# Patient Record
Sex: FEMALE | Race: WHITE | NOT HISPANIC OR LATINO
[De-identification: names, ages, dates, MRNs, and addresses within clinical notes are randomized per-mention and may not be internally consistent; named-entity substitution may affect disease eponyms.]

---

## 2017-09-17 ENCOUNTER — TRANSCRIPTION ENCOUNTER (OUTPATIENT)
Age: 22
End: 2017-09-17

## 2017-10-04 ENCOUNTER — TRANSCRIPTION ENCOUNTER (OUTPATIENT)
Age: 22
End: 2017-10-04

## 2017-12-08 ENCOUNTER — TRANSCRIPTION ENCOUNTER (OUTPATIENT)
Age: 22
End: 2017-12-08

## 2017-12-11 ENCOUNTER — TRANSCRIPTION ENCOUNTER (OUTPATIENT)
Age: 22
End: 2017-12-11

## 2018-05-01 ENCOUNTER — TRANSCRIPTION ENCOUNTER (OUTPATIENT)
Age: 23
End: 2018-05-01

## 2018-06-11 ENCOUNTER — TRANSCRIPTION ENCOUNTER (OUTPATIENT)
Age: 23
End: 2018-06-11

## 2019-03-14 ENCOUNTER — TRANSCRIPTION ENCOUNTER (OUTPATIENT)
Age: 24
End: 2019-03-14

## 2019-07-15 ENCOUNTER — APPOINTMENT (OUTPATIENT)
Dept: SURGERY | Facility: CLINIC | Age: 24
End: 2019-07-15
Payer: COMMERCIAL

## 2019-07-15 VITALS
WEIGHT: 173.5 LBS | DIASTOLIC BLOOD PRESSURE: 70 MMHG | HEART RATE: 84 BPM | OXYGEN SATURATION: 100 % | SYSTOLIC BLOOD PRESSURE: 108 MMHG | HEIGHT: 63 IN | BODY MASS INDEX: 30.74 KG/M2

## 2019-07-15 DIAGNOSIS — R10.11 RIGHT UPPER QUADRANT PAIN: ICD-10-CM

## 2019-07-15 DIAGNOSIS — G89.29 RIGHT UPPER QUADRANT PAIN: ICD-10-CM

## 2019-07-15 DIAGNOSIS — R10.9 UNSPECIFIED ABDOMINAL PAIN: ICD-10-CM

## 2019-07-15 PROCEDURE — 99204 OFFICE O/P NEW MOD 45 MIN: CPT

## 2019-07-24 ENCOUNTER — TRANSCRIPTION ENCOUNTER (OUTPATIENT)
Age: 24
End: 2019-07-24

## 2019-07-26 ENCOUNTER — FORM ENCOUNTER (OUTPATIENT)
Age: 24
End: 2019-07-26

## 2019-07-26 PROBLEM — R10.11 CHRONIC RIGHT UPPER QUADRANT PAIN: Status: ACTIVE | Noted: 2019-07-26

## 2019-07-27 ENCOUNTER — OUTPATIENT (OUTPATIENT)
Dept: OUTPATIENT SERVICES | Facility: HOSPITAL | Age: 24
LOS: 1 days | End: 2019-07-27
Payer: COMMERCIAL

## 2019-07-27 ENCOUNTER — APPOINTMENT (OUTPATIENT)
Dept: CT IMAGING | Facility: CLINIC | Age: 24
End: 2019-07-27
Payer: COMMERCIAL

## 2019-07-27 DIAGNOSIS — J35.3 HYPERTROPHY OF TONSILS WITH HYPERTROPHY OF ADENOIDS: Chronic | ICD-10-CM

## 2019-07-27 DIAGNOSIS — Z00.8 ENCOUNTER FOR OTHER GENERAL EXAMINATION: ICD-10-CM

## 2019-07-27 PROCEDURE — 74177 CT ABD & PELVIS W/CONTRAST: CPT | Mod: 26

## 2019-07-27 PROCEDURE — 74177 CT ABD & PELVIS W/CONTRAST: CPT

## 2019-07-30 ENCOUNTER — CLINICAL ADVICE (OUTPATIENT)
Age: 24
End: 2019-07-30

## 2019-07-31 PROBLEM — R10.9 ABDOMINAL PAIN: Status: ACTIVE | Noted: 2019-07-15

## 2019-07-31 NOTE — HISTORY OF PRESENT ILLNESS
[de-identified] : Very pleasant young female with history of longstanding, low grade abdominal discomfort.\par She is also under the care of a gastroenterologist for GERD, which she feels is well controlled.\par The patient now complains of progressive and now nearly continuous epigastric discomfort, sometimes RUQ pain and intermittent RUQ tenderness.\par She reports sensations of postprandial nausea, actual abdominal bloating/ distension and a feeling of food "being stuck."\par To date, an abdominal sonogram as well as an EGD and subsequent nuclear emptying study have been done through her GI or via ER settings.

## 2019-07-31 NOTE — REASON FOR VISIT
[Initial Evaluation] : an initial evaluation [FreeTextEntry1] : of abdominal pain, RUQ tenderness and ongoing reflux...

## 2019-07-31 NOTE — PLAN
[FreeTextEntry1] : Longstanding history of abdominal discomfort/ pain and associated GI complaints suggestive of IBS.\par However, recent complaints also suggestive new issue(s).\par EGD and Pathology generally reassuring and consistent with controlled GERD.\par Importance of compliance and ongoing follow-up discussed with patient and mother.\par In regards to more focal Epigastric discomfort and RUQ pain with questionable mass in setting of tenderness, I would favor CT to rule out lesion (to date sonography reassuring/ non-directive.)\par IF CT is unrevealing, then consideration could also be given to HIDA CCK to assist in ascertaining biliary dyskinesia.\par In the meanwhile, I have encouraged the patient to call with interval questions, further future concerns or additional changes.\par She has been asked to continue to track her symptoms in the meanwhile to hopefully establish more clearly any palliative or provocative factors.

## 2019-07-31 NOTE — PHYSICAL EXAM
[Normal Rate and Rhythm] : normal rate and rhythm [Respiratory Effort] : normal respiratory effort [Alert] : alert [No Rash or Lesion] : No rash or lesion [Oriented to Person] : oriented to person [Oriented to Time] : oriented to time [Oriented to Place] : oriented to place [de-identified] : Appears well, no acute distress, ambulates easily into office and assumes examination table without need of assistance. [Anxious] : anxious [de-identified] : Supple with full range of motion. [de-identified] : Normocephalic and atraumatic. [FreeTextEntry1] : No cervical, supraclavicular, axillary or inguinal adenopathy. [de-identified] : N [de-identified] : Grossly symmetric and within normal limits without any obvious motor or sensory deficits. [de-identified] : Slightly full or mildly obese, but soft and nontense.\par Epigastrium free of visible bulges or palpable masses.\par Tenderness and questionable fullness at and around right costal margin, though definitive examination difficult secondary to patient's anxiety and discomfort.  Periumbilical fascia intact.  Bilateral groins unremarkable during cough and Valsalva type maneuvers. [de-identified] : Deferred. [de-identified] : N

## 2019-07-31 NOTE — REVIEW OF SYSTEMS
[Feeling Tired] : feeling tired [As Noted in HPI] : as noted in HPI [Anxiety] : anxiety [Depression] : depression [Easy Bruising] : a tendency for easy bruising [Negative] : Endocrine

## 2019-07-31 NOTE — DATA REVIEWED
[FreeTextEntry1] : EGD report reviewed and discussed.\par EGD Pathology report reviewed and discussed.\par Nuclear gastric emptying study report reviewed and discussed.\par Obtaining sonography report from study done earlier this year at [facility].

## 2019-10-08 ENCOUNTER — TRANSCRIPTION ENCOUNTER (OUTPATIENT)
Age: 24
End: 2019-10-08

## 2019-10-24 ENCOUNTER — CLINICAL ADVICE (OUTPATIENT)
Age: 24
End: 2019-10-24

## 2019-11-06 ENCOUNTER — TRANSCRIPTION ENCOUNTER (OUTPATIENT)
Age: 24
End: 2019-11-06

## 2020-01-17 ENCOUNTER — CLINICAL ADVICE (OUTPATIENT)
Age: 25
End: 2020-01-17

## 2020-01-22 ENCOUNTER — CLINICAL ADVICE (OUTPATIENT)
Age: 25
End: 2020-01-22

## 2020-02-12 ENCOUNTER — TRANSCRIPTION ENCOUNTER (OUTPATIENT)
Age: 25
End: 2020-02-12

## 2020-02-25 ENCOUNTER — TRANSCRIPTION ENCOUNTER (OUTPATIENT)
Age: 25
End: 2020-02-25

## 2020-05-20 ENCOUNTER — APPOINTMENT (OUTPATIENT)
Dept: BARIATRICS/WEIGHT MGMT | Facility: CLINIC | Age: 25
End: 2020-05-20
Payer: COMMERCIAL

## 2020-05-20 VITALS — WEIGHT: 172 LBS

## 2020-05-20 PROCEDURE — 99205 OFFICE O/P NEW HI 60 MIN: CPT | Mod: 95

## 2020-05-20 NOTE — ASSESSMENT
[FreeTextEntry1] : 25 y.o student with Class 1 obesity, GERD with chronic low grade nausea, insulin resistance - prediabetes in teen years, likely PCOS here for weight management. \par \par - start metformin for likely PCOS and insulin resistance, and phentermine 15mg\par - water intake 64 oz minimum\par - decrease snacking, increase fiber dottie in breakfast\par Labs - get labs done when we are able to.  Patient lives in [place]. \par \par Extensive dietary counseling was provided:\par -discussed calorie reduction options: Mediterranean w/ calorie reduction - she is interested in having meatless days. She has been told "chicken chicken chicken" by folks at the [employer] arts/boxing school, so that would be different for her\par -three meal components emphasized: large portion vegetables/fruit, smaller portion protein favoring plant-based, smaller portion high fiber carbohydrates\par -properties of macronutrients were reviewed to help the patient understand why a balanced diet is important\par -discussed the avoidance of red and processed meat, sugar sweetened beverages, refined carbohydrates, high fat dairy\par -advised avoidance of snack products and packaged / processed foods\par -counseled about meal timing and portion: large breakfast, medium lunch, small dinner\par -advised to avoid carbohydrates in evening if possible\par -regular water or seltzer throughout the day\par -for stimulus control, advised to keep unhealthy foods out of the house or out of view\par -recommended abstaining entirely from restaurant / fast food / take-out meals\par \par Lifestyle recommendations for weight loss were extensively reviewed\par -aerobic exercise reviewed: moderate intensity versus high intensity exercise - an estimate of daily / weekly time requirements was reviewed\par -emphasized that resistance training in addition to aerobic may provide added benefit\par -emphasized that long term weight loss and maintenance depend upon exercise\par -the need for adequate sleep (6+ hours) was reviewed - will follow sleep quality, fatigue\par \par f/u 2 weeks with this writer\par RDN consult\par \par Bariatric surgery history: none\par Obesity co-morbidities:gerd, insulin resistance\par Comorbidities improved or resolved:n/a\par Anti-obesity medications:started today - phentermine, metformin\par Obesity medication side effects:n/a\par

## 2020-05-20 NOTE — HISTORY OF PRESENT ILLNESS
[Home] : at home, [unfilled] , at the time of the visit. [Verbal consent obtained from patient] : the patient, [unfilled] [Medical Office: ([facility])___] : at the medical office located in  [FreeTextEntry1] : **Verbal consent was obtained over the phone for patient for this telehealth visit which was done via [facility]. Written consult was unable to be obtained to covid emergency.**\par \par Ms. JON MELCHOR is a 25 year year old female who presents for evaluation and treatment of Class 1 obesity. \par \par Obesity related co-morbidities:  GERD - has had several endoscopies in the past, last was 1 year ago.  \par Has been on the prozac for depression for about a year.  and buspar - anxiety.  \par \par In the past - borderline diabetes around 13-15 y.o.  All 4 grandparents have diabetes. \par Depression - talks to therapist once a week, to focus on herself more.  She has improved over the last year in doing so. \par \par +some family trauma. college - was crying every day and felt "this is just not how I want to live"\par GERD-Feels like she's "always nauseous."   She feels that she's always felt this way. \par Patient lives - with 2 roommates.  Roommates buy their own food.\par Employment status - grad student, in psychology field\par \par Weight History:\par Lowest adult weight: 162\par Highest adult weight:198\par \par Has lost 20-25 pounds over the past year since July\par \par Obesity- began in childhood.  Mother has lap band in place.  Weight gain has occurred with: gradual, unsure of exactly why.  She had previously been eating more processed foods but has "never been an overeater, or snacking on a lot of food"\par \par Has a cyst on ovary since age 14, has been on birth control since then. \par \par Since June 2019 - lost 20-25. Did a 6 week plan with a [employer] arts/fight club, with their food plan - she started to eat more healthy food.  She was on phentermine as well for 3 months, and then another 3 months from her gyn PA.  That also helped. She's been weight stable for the past 2-3 months off of phentermine.\par \par Past weight loss attempts include:  Weight Watchers, diet pills, sara gomez - . These have produced a maximum of 20-25 pounds of weight loss.  \par Anti-obesity medications in the past: phentermine\par \par Reasons for desiring weight loss:feel confident\par Perceived obstacles to losing weight: weight regain\par \par Sleep: 7-8 hours. always feel tired during the day. \par \par Has 3 regular meals a day. \par \par Diet history:\par wakes up at: 7am \par B: 8:30am - eggs w veggies, or protein shake with fruit\par L: 12:30-1pm - brown rice, veggie and a meat\par D: protein, veggie, something else\par \par maybe on the weekends - take out.  making meals at home. \par snacks: afternoon and evening. carrots and  hummus, fruit, ice cream.  about 2 snacks per day. \par eating after dinner: sometimes - yes. protein "dessert" with shake or peanut butter. \par overeating episodes: No.  Able to control her portions. \par \par Sodas/fast food/processed foods:no sodas. take out sometimes 1-2 times a week. \par coffee: in the morning with breakfast or afternoon. \par \par Water intake per day: struggles to get in per day. Right now, has a gallon per day. \par \par Physical activity:\par Patient enjoys: weightlifting, boxing\par Current physical activity: 4-5 times a week, work out with trainers.  At least 1 hr each day. Zoom meeting with friend - doing that Friday, doing another 4 weeks. \par joined weight loss challenge at a fight club.  HIT workout - muscle training, with cardio involved.  \par \par Habits patient would like to change: eat healthier. \par Level of interest in losing weight: 5/5\par Community support: 3/5\par \par Factors that have helped in the past with losing weight and keeping it off: medication, exercise, putting the time in. \par \par

## 2020-05-20 NOTE — REVIEW OF SYSTEMS
[Patient Intake Form Reviewed] : Patient intake form was reviewed [MED-ROS-Gastro-FT] : chronic nausea [MED-ROS-Cons-FT] : weight gain, fatigue

## 2020-05-20 NOTE — REASON FOR VISIT
[Initial Consultation] : an initial consultation for [Fatigue] : fatigue  [Obesity] : obesity [FreeTextEntry2] : gerd

## 2020-06-04 ENCOUNTER — APPOINTMENT (OUTPATIENT)
Dept: BARIATRICS/WEIGHT MGMT | Facility: CLINIC | Age: 25
End: 2020-06-04
Payer: COMMERCIAL

## 2020-06-04 VITALS — WEIGHT: 173 LBS

## 2020-06-04 PROCEDURE — 99214 OFFICE O/P EST MOD 30 MIN: CPT | Mod: 95

## 2020-06-04 RX ORDER — PHENTERMINE HYDROCHLORIDE 15 MG/1
15 CAPSULE ORAL DAILY
Qty: 30 | Refills: 0 | Status: DISCONTINUED | COMMUNITY
Start: 2020-05-20 | End: 2020-06-04

## 2020-06-04 NOTE — ASSESSMENT
[FreeTextEntry1] : 25 y.o student with Class 1 obesity, GERD with chronic low grade nausea, insulin resistance - prediabetes in teen years, likely PCOS here for weight management. \par \par - she senses that she has more of a sweet tooth now that she's decreased animal meat signficantly. \par \par - continue metformin, increase phentermine to 37.5mg capsule\par - water intake 64 oz minimum - patient will try better with water\par - decrease snacking, increase fiber dottie in breakfast - > she is avoiding eggs for breakfast.  She has rolled oats, talked about adding some toppings like cinnamon, vanilla, cocoa powder.\par - Labs - get labs done when we are able to.  Patient lives in [place]. \par \par f/u 2 weeks with this writer\par RDN consult\par \par Bariatric surgery history: none\par Obesity co-morbidities:gerd, insulin resistance\par Comorbidities improved or resolved:n/a\par Anti-obesity medications:started today - phentermine, metformin\par Obesity medication side effects:n/a\par

## 2020-06-04 NOTE — HISTORY OF PRESENT ILLNESS
[Home] : at home, [unfilled] , at the time of the visit. [Medical Office: ([facility])___] : at the medical office located in  [Verbal consent obtained from patient] : the patient, [unfilled] [FreeTextEntry1] : **Verbal consent was obtained over the phone for patient for this telehealth visit which was done via [employer] Written consult was unable to be obtained to covid emergency.**\par \par Ms. JON MELCHOR is a 25 year year old female who presents for evaluation and treatment of Class 1 obesity. \par \par - patient lives in [place].  would like to get labs sometime eventually when able.\par \par Obesity related co-morbidities:  GERD - has had several endoscopies in the past, last was 1 year ago.  \par Has been on the prozac for depression for about a year.  and buspar - anxiety.  \par \par Meds\par Started 15mg phentermine - feeling less bloated, but still having appetite between meals.  "not cravings, true hunger."  she states that without eating meats/chicken she has increased hunger as well, "maybe because I miss it"\par \par -112 fasting glucose - she has an old glucometer and she checks it on occasion. \par breakfast 8:30am, lunch, 12-1pm, dinner 4:30-5pm\par between lunch and dinner, she has increased appetite, sometimes after dinner.\par She has tried phentermine 37.5 in the past, would like to try. \par \par In the past - borderline diabetes around 13-15 y.o.  All 4 grandparents have diabetes. \par Depression - talks to therapist once a week, to focus on herself more.  She has improved over the last year in doing so. \par \par getting a sweet tooth craving - sometimes she'll do protein powder\par breakfast - rolled oats, strawberries. \par 2 days a week - will have seafood or chicken, asked about eggs for breakfast\par weight - 173#\par \par Summer classes starting soon.  Grad student.  She was always on the go before, didn't think about food much, but not she is because she's always at home. \par \par Water - 32 oz per day daily. Wants to increase to 64 oz. \par Exercise 4 days a , cardio HIIT workout\par \par Patient lives - with 2 roommates.  Roommates buy their own food.\par Employment status - grad student, in psychology field\par

## 2020-06-04 NOTE — PHYSICAL EXAM
[Normal] : PERRL, EOMI, no conjunctival injection, anicteric [Obese, well nourished, in no acute distress] : obese, well nourished, in no acute distress

## 2020-06-04 NOTE — REASON FOR VISIT
[Follow-Up Visit] : a follow-up visit for [Obesity] : obesity [FreeTextEntry2] : GERD, insulin resistance, anxiety

## 2020-06-05 ENCOUNTER — APPOINTMENT (OUTPATIENT)
Dept: BARIATRICS/WEIGHT MGMT | Facility: CLINIC | Age: 25
End: 2020-06-05

## 2020-06-19 ENCOUNTER — APPOINTMENT (OUTPATIENT)
Dept: BARIATRICS/WEIGHT MGMT | Facility: CLINIC | Age: 25
End: 2020-06-19

## 2020-07-24 ENCOUNTER — TRANSCRIPTION ENCOUNTER (OUTPATIENT)
Age: 25
End: 2020-07-24

## 2020-08-03 ENCOUNTER — TRANSCRIPTION ENCOUNTER (OUTPATIENT)
Age: 25
End: 2020-08-03

## 2020-08-03 ENCOUNTER — APPOINTMENT (OUTPATIENT)
Dept: BARIATRICS/WEIGHT MGMT | Facility: CLINIC | Age: 25
End: 2020-08-03
Payer: COMMERCIAL

## 2020-08-03 VITALS — WEIGHT: 176.2 LBS

## 2020-08-03 PROCEDURE — 99214 OFFICE O/P EST MOD 30 MIN: CPT | Mod: 95

## 2020-08-03 NOTE — ASSESSMENT
[FreeTextEntry1] : 25 y.o student with Class 1 obesity, GERD with chronic low grade nausea, insulin resistance - prediabetes in teen years, likely PCOS here for weight management. \par \par - will get blood work.  Will see if insurance would cover GLP-1. She is open to injection.  Provided her with some brand names. \par - can stop phentermine when she is done with medications as it is not affecting her appetite, would recommend continuing metfomrin\par - she senses that she has more of a sweet tooth now that she's decreased animal meat significantly. \par - seeing RDN soon.\par - water intake 64 oz minimum - patient will try better with water\par - decrease snacking, increase fiber dottie in breakfast - > she is avoiding eggs for breakfast.  She has rolled oats, talked about adding some toppings like cinnamon, vanilla, cocoa powder.\par \par f/u 2 weeks with this writer\par RDN consult\par \par Bariatric surgery history: none\par Obesity co-morbidities:gerd, insulin resistance\par Comorbidities improved or resolved:n/a\par Anti-obesity medications: metformin\par Obesity medication side effects:n/a\par

## 2020-08-03 NOTE — HISTORY OF PRESENT ILLNESS
[Home] : at home, [unfilled] , at the time of the visit. [Medical Office: ([facility])___] : at the medical office located in  [Verbal consent obtained from patient] : the patient, [unfilled] [FreeTextEntry1] : **Verbal consent was obtained over the phone for patient for this telehealth visit which was done via [employer]. Written consult was unable to be obtained to covid emergency.**\par \par Ms. JON MELCHOR is a 25 year year old female who presents for evaluation and treatment of Class 1 obesity. \par \par - patient lives in [place].  She would like to gets labs.  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  She is doing that 2x a week.  And also some zoom classes - 1 time a weekend.  \par Food habits - feels a constant hunger throughout the day between meals.  She did not have this experience when she was on phentermine 37.5 before. She does not take metformin consistently.  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 Or on the go, a granola bar. \par lunch - noon.  brown rice 1/2 cup, turkey/beef/chicken breast - 4 oz or so, and vegetables - she packs her lunch. \par dinner - 5pm. 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  All 4 grandparents have diabetes. \par Depression - talks to therapist once a week, to focus on herself more.  She has improved over the last year in doing so. \par \par Water\par 32 oz per day daily. Wants to increase to 64 oz. \par \par Sleep\par sleeping well overall, 7-8 hours per night\par \par Patient lives - with 2 roommates.  Roommates buy their own food.\par

## 2020-08-11 LAB
25(OH)D3 SERPL-MCNC: 53.8 NG/ML
ALBUMIN SERPL ELPH-MCNC: 4.3 G/DL
ALP BLD-CCNC: 55 U/L
ALT SERPL-CCNC: 16 U/L
ANION GAP SERPL CALC-SCNC: 10 MMOL/L
AST SERPL-CCNC: 17 U/L
BASOPHILS # BLD AUTO: 0.03 K/UL
BASOPHILS NFR BLD AUTO: 0.5 %
BILIRUB SERPL-MCNC: 0.2 MG/DL
BUN SERPL-MCNC: 9 MG/DL
CALCIUM SERPL-MCNC: 9.7 MG/DL
CHLORIDE SERPL-SCNC: 102 MMOL/L
CHOLEST SERPL-MCNC: 161 MG/DL
CHOLEST/HDLC SERPL: 3.8 RATIO
CO2 SERPL-SCNC: 24 MMOL/L
CREAT SERPL-MCNC: 0.76 MG/DL
CRP SERPL HS-MCNC: 3.8 MG/L
EOSINOPHIL # BLD AUTO: 0.17 K/UL
EOSINOPHIL NFR BLD AUTO: 2.8 %
ESTIMATED AVERAGE GLUCOSE: 103 MG/DL
FERRITIN SERPL-MCNC: 70 NG/ML
GLUCOSE SERPL-MCNC: 91 MG/DL
HBA1C MFR BLD HPLC: 5.2 %
HCT VFR BLD CALC: 42.3 %
HDLC SERPL-MCNC: 42 MG/DL
HGB BLD-MCNC: 14.1 G/DL
IMM GRANULOCYTES NFR BLD AUTO: 0.3 %
INSULIN P FAST SERPL-ACNC: 16.7 UU/ML
IRON SATN MFR SERPL: 21 %
IRON SERPL-MCNC: 84 UG/DL
LDLC SERPL CALC-MCNC: 88 MG/DL
LYMPHOCYTES # BLD AUTO: 2.64 K/UL
LYMPHOCYTES NFR BLD AUTO: 43.3 %
MAN DIFF?: NORMAL
MCHC RBC-ENTMCNC: 31.2 PG
MCHC RBC-ENTMCNC: 33.3 GM/DL
MCV RBC AUTO: 93.6 FL
MONOCYTES # BLD AUTO: 0.33 K/UL
MONOCYTES NFR BLD AUTO: 5.4 %
NEUTROPHILS # BLD AUTO: 2.9 K/UL
NEUTROPHILS NFR BLD AUTO: 47.7 %
PLATELET # BLD AUTO: 227 K/UL
POTASSIUM SERPL-SCNC: 4.7 MMOL/L
PROT SERPL-MCNC: 7 G/DL
RBC # BLD: 4.52 M/UL
RBC # FLD: 12.2 %
SODIUM SERPL-SCNC: 137 MMOL/L
T3FREE SERPL-MCNC: 3.05 PG/ML
T4 FREE SERPL-MCNC: 1 NG/DL
TIBC SERPL-MCNC: 407 UG/DL
TRIGL SERPL-MCNC: 158 MG/DL
TSH SERPL-ACNC: 1.3 UIU/ML
UIBC SERPL-MCNC: 322 UG/DL
WBC # FLD AUTO: 6.09 K/UL

## 2020-08-12 ENCOUNTER — APPOINTMENT (OUTPATIENT)
Dept: BARIATRICS/WEIGHT MGMT | Facility: CLINIC | Age: 25
End: 2020-08-12
Payer: COMMERCIAL

## 2020-08-12 VITALS — WEIGHT: 176 LBS

## 2020-08-12 PROCEDURE — 99214 OFFICE O/P EST MOD 30 MIN: CPT | Mod: 95

## 2020-08-12 RX ORDER — PHENTERMINE HYDROCHLORIDE 37.5 MG/1
37.5 CAPSULE ORAL
Qty: 30 | Refills: 0 | Status: DISCONTINUED | COMMUNITY
Start: 2020-06-04 | End: 2020-08-12

## 2020-08-12 NOTE — ASSESSMENT
[FreeTextEntry1] : 25 y.o student with Class 1 obesity, GERD with chronic low grade nausea, insulin resistance - prediabetes in teen years, likely PCOS here for weight management. \par \par - start Ozempic. Discussed side effects.  She already has some nausea with GERD, will monitor. \par - recommend continuing metformin\par - she senses that she has more of a sweet tooth now that she's decreased animal meat significantly. \par - seeing RDN soon.\par - water intake 64 oz minimum - patient will try better with water\par - decrease snacking, increase fiber dottie in breakfast - > she is avoiding eggs for breakfast.  She has rolled oats, talked about adding some toppings like cinnamon, vanilla, cocoa powder.\par \par f/u 2 weeks with this writer\par RDN consult\par \par Bariatric surgery history: none\par Obesity co-morbidities:gerd, insulin resistance\par Comorbidities improved or resolved:n/a\par Anti-obesity medications: metformin, Ozempic\par Obesity medication side effects:n/a\par

## 2020-08-12 NOTE — HISTORY OF PRESENT ILLNESS
[Home] : at home, [unfilled] , at the time of the visit. [Medical Office: ([facility])___] : at the medical office located in  [Verbal consent obtained from patient] : the patient, [unfilled] [FreeTextEntry1] : **Verbal consent was obtained over the phone for patient for this telehealth visit which was done via [facility]. Written consult was unable to be obtained to covid emergency.**\par \par Ms. JON MELCHOR is a 25 year year old female who presents for evaluation and treatment of Class 1 obesity. \par \par Labs done - slightly elev CRP, TG, low HDL. Normal A1c 5.2.\par She would like to start Ozempic.\par \par Obesity related co-morbidities:  GERD, depression/anxiety.\par \par Interim - \par Food habits - feels a constant hunger throughout the day between meals.  Phentermine nor metformin have helped with this appetite. \par She is exercising, eating mostly home cooked meals. \par \par Dietary\par After a meal, still has hunger - Wanting something "sweet, salty."\par eats at 8am - "it depends" - coffee, oats with peanut butter and fruit, or eggs. Or on the go, a granola bar. \par lunch - noon.  brown rice 1/2 cup, turkey/beef/chicken breast - 4 oz or so, and vegetables - she packs her lunch. 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 \par \par Depression - talks to therapist once a week, to focus on herself more.  She has improved over the last year in doing so. \par \par Water\par 32 oz per day daily. Wants to increase to 64 oz. \par \par Sleep\par sleeping well overall, 7-8 hours per night\par \par Patient lives - with 2 roommates.  Roommates buy their own food.\par

## 2020-08-12 NOTE — PHYSICAL EXAM
[Obese, well nourished, in no acute distress] : obese, well nourished, in no acute distress [Normal] : PERRL, EOMI, no conjunctival injection, anicteric

## 2020-08-26 ENCOUNTER — APPOINTMENT (OUTPATIENT)
Dept: BARIATRICS/WEIGHT MGMT | Facility: CLINIC | Age: 25
End: 2020-08-26
Payer: COMMERCIAL

## 2020-08-26 PROCEDURE — 99214 OFFICE O/P EST MOD 30 MIN: CPT | Mod: 95

## 2020-08-30 VITALS — HEIGHT: 63 IN | WEIGHT: 179 LBS | BODY MASS INDEX: 31.71 KG/M2

## 2020-08-31 NOTE — ASSESSMENT
[FreeTextEntry1] : 25 y.o student with Class 1 obesity, GERD with chronic low grade nausea, insulin resistance - prediabetes in teen years, likely PCOS here for weight management. \par \par - continue Ozempic. denies increased nausea.  would recommend holding off on increasing until seen by gi. \par - recommend f/u with GI\par - she is not taking metformin anymore due to not feeling any effect\par - seeing RDN soon.\par - water intake 64 oz minimum - patient will try better with water\par - decrease snacking, increase fiber dottie in breakfast - > she is avoiding eggs for breakfast.  She has rolled oats, talked about adding some toppings like cinnamon, vanilla, cocoa powder.\par \par f/u 2 weeks with this writer\par RDN consult\par \par Bariatric surgery history: none\par Obesity co-morbidities:gerd, insulin resistance\par Comorbidities improved or resolved:n/a\par Anti-obesity medications: metformin, Ozempic\par Obesity medication side effects:n/a\par

## 2020-08-31 NOTE — HISTORY OF PRESENT ILLNESS
[Home] : at home, [unfilled] , at the time of the visit. [Verbal consent obtained from patient] : the patient, [unfilled] [Medical Office: ([facility])___] : at the medical office located in  [FreeTextEntry1] : **Verbal consent was obtained over the phone for patient for this telehealth visit which was done via [employer]. Written consult was unable to be obtained to covid emergency.**\par \par Ms. JON MELCHOR is a 25 year year old female with Class 1 obesity, GERD, depression anxiety presents for weight management. \par \par Labs done - slightly elev CRP, TG, low HDL. Normal A1c 5.2.\par \par Most recent weight - 179#\par \par Interim\par Started Ozempic last Saturday, so has taken 2 doses so far.  Doing well hasn't noticed much of appetite change but open to giving it some more time. \par Has not noticed any changes.  Joined support group with [employer] regarding Ozempic. \par Having some bloating, but this past month she has gained weight and feeling bloated.  She is frustrated that she is feeling bloated, this was going on before weight management and she was seeing GI but workup was interrupted by Covid. "I can drink some water and feel bloated." She is open to seeing GI again.  She doesn't know what work up needs to be done. \par \par She is exercising, eating mostly home cooked meals. \par \par Dietary\par has been doing more protein shakes.  \par eats at 8am - "it depends" - coffee, oats with peanut butter and fruit, or eggs. Or on the go, a granola bar. \par lunch - noon.  brown rice 1/2 cup, turkey/beef/chicken breast - 4 oz or so, and vegetables - she packs her lunch. 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 \par \par Depression - talks to therapist once a week, to focus on herself more.  She has improved mood over the last year in doing so. \par \par Water\par Felt a little dehydrated.  Bought "Liquid IV" packs 32 oz per day daily  -does have beet sugar and dextrose in it, recommended against this. Wants to increase to 64 oz. 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 \par Patient lives - with 2 roommates.  Roommates buy their own food.\par

## 2020-09-10 ENCOUNTER — APPOINTMENT (OUTPATIENT)
Dept: BARIATRICS/WEIGHT MGMT | Facility: CLINIC | Age: 25
End: 2020-09-10
Payer: COMMERCIAL

## 2020-09-10 VITALS — WEIGHT: 180 LBS | HEIGHT: 63 IN | BODY MASS INDEX: 31.89 KG/M2

## 2020-09-10 PROCEDURE — 99214 OFFICE O/P EST MOD 30 MIN: CPT | Mod: 95

## 2020-09-10 RX ORDER — METFORMIN ER 500 MG 500 MG/1
500 TABLET ORAL DAILY
Qty: 30 | Refills: 0 | Status: DISCONTINUED | COMMUNITY
Start: 2020-05-20 | End: 2020-09-10

## 2020-09-10 NOTE — HISTORY OF PRESENT ILLNESS
[Medical Office: ([facility])___] : at the medical office located in  [Home] : at home, [unfilled] , at the time of the visit. [Verbal consent obtained from patient] : the patient, [unfilled] [FreeTextEntry1] : **Verbal consent was obtained over the phone for patient for this telehealth visit which was done via [employer]. Written consult was unable to be obtained to covid emergency.**\par \par Ms. JON MELCHOR is a 25 year year old female with Class 1 obesity, GERD, depression anxiety presents for weight management. \par \par Labs done - slightly elev CRP, TG, low HDL. Normal A1c 5.2.\par \par Most recent weight - 180# - stable.  no weight loss. initial weight may - 172#\par \par Interim\par Babysitting more. Also interning at hospital. \par Has taken about 4 doses of ozempic, does not see any decrease in appetite.  Willing to continue for another month.  Also asks about phentermine tab (may was on phentermine capsule, did not notice any changes)\par Having some bloating, but this past month she has gained weight and feeling bloated.  She is frustrated that she is feeling bloated, this was going on before weight management and she was seeing surg but workup was interrupted by Covid. "I can drink some water and feel bloated." She is open to seeing GI again. Dr. Duran note discusses hida cck scan pending. \par \par Physical activity\par She is exercising - at least 3 times a week.  starting with a , will talk tomorrow, wanting to do a 6 week program.\par eating mostly home cooked meals, mostly plant based. She is frustrated because she is not overeating, not snacking and is not seeing any weight loss. \par \par Dietary\par eats at 8am - "it depends" - coffee, oats with peanut butter and fruit, or eggs. Or on the go, a granola bar. \par lunch - noon.  brown rice 1/2 cup, turkey/beef/chicken breast - 4 oz or so, and vegetables - she packs her lunch. 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  She has improved mood over the last year in doing so. \par \par Water\par Has increased water to close to 64 oz per day, adding liquid IV (which is not recommended long term as it contains beet sugar)\par \par Sleep\par sleeping well overall, 7-8 hours per night\par \par Patient lives - with 2 roommates.  \par

## 2020-09-10 NOTE — ASSESSMENT
[FreeTextEntry1] : 25 y.o student with Class 1 obesity, GERD with chronic low grade nausea, insulin resistance - prediabetes in teen years, likely PCOS here for weight management. \par \par - restart phentermine 37.5mg tab, take 1/2 tab at first and increase as tolerated \par - continue Ozempic. denies increased nausea.  would recommend holding off on increasing until seen by gi. \par - recommend f/u with gi - getting pending order - hida scan\par - seeing RDN soon.\par - water intake 64 oz minimum - patient will try better with water\par - decrease snacking, increase fiber dottie in breakfast - > she is avoiding eggs for breakfast.  She has rolled oats, talked about adding some toppings like cinnamon, vanilla, cocoa powder.\par \par f/u 2-4 weeks with this writer - patient will call to schedule\par RDN consult\par \par Bariatric surgery history: none\par Obesity co-morbidities:gerd, insulin resistance\par Comorbidities improved or resolved:n/a\par Anti-obesity medications: phentermine, Ozempic\par Obesity medication side effects:n/a\par

## 2020-10-08 ENCOUNTER — APPOINTMENT (OUTPATIENT)
Dept: BARIATRICS/WEIGHT MGMT | Facility: CLINIC | Age: 25
End: 2020-10-08
Payer: COMMERCIAL

## 2020-10-08 VITALS — BODY MASS INDEX: 30.83 KG/M2 | HEIGHT: 63 IN | WEIGHT: 174 LBS

## 2020-10-08 PROCEDURE — 99443: CPT

## 2020-10-11 NOTE — ASSESSMENT
[FreeTextEntry1] : 25 y.o student with Class 1 obesity, GERD with chronic low grade nausea, insulin resistance - prediabetes in teen years, likely PCOS here for weight management. \par \par -continue phentermine 37.5mg tab\par - increase to 0.5mg Ozempic. denies increased nausea.\par - recommend f/u with gi - getting pending order - hida scan\par - seeing RDN soon.\par - water intake 64 oz minimum - patient will try better with water\par - decrease snacking, increase fiber dottie in breakfast - > she is avoiding eggs for breakfast.  She has rolled oats, talked about adding some toppings like cinnamon, vanilla, cocoa powder.\par \par f/u 4 weeks with this writer - patient will call to schedule\par \par Bariatric surgery history: none\par Obesity co-morbidities:gerd, insulin resistance\par Comorbidities improved or resolved:n/a\par Anti-obesity medications: phentermine, Ozempic\par Obesity medication side effects:n/a\par

## 2020-10-11 NOTE — HISTORY OF PRESENT ILLNESS
[Home] : at home, [unfilled] , at the time of the visit. [Medical Office: ([facility])___] : at the medical office located in  [Verbal consent obtained from patient] : the patient, [unfilled] [FreeTextEntry1] : **Verbal consent was obtained over the phone for patient for this visit which was done via telephone. Written consult was unable to be obtained to covid emergency.**\par \par Ms. JON MELCHOR is a 25 year year old female with Class 1 obesity, GERD, depression anxiety presents for weight management. \par \par Labs done - slightly elev CRP, TG, low HDL. Normal A1c 5.2.\par \par Most recent weight - 174# - 6# wt loss in a month. initial weight may - 172#\par \par Interim\par Most progress - now with phentermine and Ozempic.  Meal prepping each week.  \par phentermine - 1 tab in the morning. \par ozempic - 0.25 mg weekly. Doing well, needs refills.  Would be okay with increasing dosage to 0.5mg weekly. \par Bloating has improved. She is open to seeing GI again. Dr. Duran note discusses hida cck scan pending. \par \par Physical activity\par She is exercising - at least 3 times a week. working with .\par eating mostly home cooked meals, mostly plant based. \par \par Dietary\par eats at 8am - "it depends" - coffee, oats with peanut butter and fruit, or eggs. Or on the go, a granola bar. \par lunch - noon.  brown rice 1/2 cup, turkey/beef/chicken breast - 4 oz or so, and vegetables - she packs her lunch. 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  She has improved mood over the last year in doing so. \par \par Water\par Has increased water to close to 64 oz per day, adding liquid IV (which is not recommended long term as it contains beet sugar)\par \par Sleep\par sleeping well overall, 7-8 hours per night\par \par Patient lives - with 2 roommates.  \par

## 2020-11-12 ENCOUNTER — APPOINTMENT (OUTPATIENT)
Dept: BARIATRICS/WEIGHT MGMT | Facility: CLINIC | Age: 25
End: 2020-11-12
Payer: COMMERCIAL

## 2020-11-12 VITALS — WEIGHT: 175 LBS

## 2020-11-12 DIAGNOSIS — R79.82 ELEVATED C-REACTIVE PROTEIN (CRP): ICD-10-CM

## 2020-11-12 PROCEDURE — 99443: CPT

## 2020-11-14 PROBLEM — R79.82 CRP ELEVATED: Status: ACTIVE | Noted: 2020-09-10

## 2020-11-14 NOTE — ASSESSMENT
[FreeTextEntry1] : 25 y.o student with Class 1 obesity, GERD with chronic low grade nausea, insulin resistance - prediabetes in teen years, likely PCOS here for weight management. \par \par -continue phentermine 37.5mg tab\par - increase to 0.5mg Ozempic. denies increased nausea.  Will continue for a month and re-assess, patient not sure if it's helping.  If her constipation gets worse she was told to STOP Ozempic. \par - recommend f/u with gi - getting pending order - hida scan\par - call to schedule call with ASUNCION Ward - regarding trying fodmaps for bloating/gassy feeling.\par - water intake 64 oz minimum - patient will try better with water\par \par f/u 4 weeks - telephone\par \par Bariatric surgery history: none\par Obesity co-morbidities:gerd, insulin resistance\par Comorbidities improved or resolved:n/a\par Anti-obesity medications: phentermine, Ozempic\par Obesity medication side effects: some constipation\par

## 2020-11-14 NOTE — HISTORY OF PRESENT ILLNESS
[FreeTextEntry1] : **Verbal consent was obtained over the phone for patient for this visit which was done via telephone. Written consult was unable to be obtained to covid emergency.**\par \par Ms. JON MELCHOR is a 25 year year old female with Class 1 obesity, GERD, depression anxiety presents for weight management. \par \par Labs done - slightly elev CRP, TG, low HDL. A1c 5.2.\par \par Most recent weight - 175#\par \par Interim\par - It's the end of the semester, so less sleep, and less water. Classes are ending next week. \par - Taking phentermine - 1 tab in the morning. \par - ozempic - 0.25 mg weekly. Doing well, needs refills.  Just increased to 0.5 weekly one week ago. \par Bloating has improved, but still there "it's daily" - gassy/bloated feeling. She is open to seeing GI again. Dr. Duran note discusses hida cck scan pending. \par - has been reading about pcos, and she's very bloated and gassy "all the times."  More constipation these days which we discussed could be due to ozempic (and/or phentermine). \par \par Physical activity\par She is exercising - at least 3 times a week. working with . "I really need to have people around me to motivate me"\par eating mostly home cooked meals, mostly plant based. \par \par Dietary\par eats at 8am - "it depends" - coffee, oats with peanut butter and fruit, or eggs. Or on the go, a granola bar. \par lunch - noon.  brown rice 1/2 cup, turkey/beef/chicken breast - 4 oz or so, and vegetables - she packs her lunch. 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  She has improved mood over the last year in doing so. \par \par Water\par Has increased water to close to 64 oz per day but b/c of finals not drinking as much, will go back up after next week\par \par Sleep\par sleeping not as great b/c of finals and last week of classes, 5-7 hours per night\par \par Patient lives - with 2 roommates.  \par

## 2021-11-22 ENCOUNTER — APPOINTMENT (OUTPATIENT)
Dept: BARIATRICS | Facility: CLINIC | Age: 26
End: 2021-11-22
Payer: COMMERCIAL

## 2021-11-22 VITALS
WEIGHT: 212.96 LBS | HEIGHT: 63 IN | HEART RATE: 100 BPM | DIASTOLIC BLOOD PRESSURE: 74 MMHG | BODY MASS INDEX: 37.73 KG/M2 | OXYGEN SATURATION: 98 % | SYSTOLIC BLOOD PRESSURE: 110 MMHG | TEMPERATURE: 96.7 F

## 2021-11-22 DIAGNOSIS — Z78.9 OTHER SPECIFIED HEALTH STATUS: ICD-10-CM

## 2021-11-22 DIAGNOSIS — Z82.49 FAMILY HISTORY OF ISCHEMIC HEART DISEASE AND OTHER DISEASES OF THE CIRCULATORY SYSTEM: ICD-10-CM

## 2021-11-22 DIAGNOSIS — Z82.3 FAMILY HISTORY OF STROKE: ICD-10-CM

## 2021-11-22 DIAGNOSIS — Z00.00 ENCOUNTER FOR GENERAL ADULT MEDICAL EXAMINATION W/OUT ABNORMAL FINDINGS: ICD-10-CM

## 2021-11-22 DIAGNOSIS — Z80.9 FAMILY HISTORY OF MALIGNANT NEOPLASM, UNSPECIFIED: ICD-10-CM

## 2021-11-22 DIAGNOSIS — Z83.3 FAMILY HISTORY OF DIABETES MELLITUS: ICD-10-CM

## 2021-11-22 PROCEDURE — 99215 OFFICE O/P EST HI 40 MIN: CPT

## 2021-11-23 PROBLEM — Z78.9 SOCIAL ALCOHOL USE: Status: ACTIVE | Noted: 2021-11-22

## 2021-11-23 NOTE — CONSULT LETTER
[Dear  ___] : Dear  [unfilled], [Consult Letter:] : I had the pleasure of evaluating your patient, [unfilled]. [Please see my note below.] : Please see my note below. [Consult Closing:] : Thank you very much for allowing me to participate in the care of this patient.  If you have any questions, please do not hesitate to contact me. [Sincerely,] : Sincerely, [FreeTextEntry3] : Sunita French MD, FACS

## 2021-11-23 NOTE — REVIEW OF SYSTEMS
[Fatigue] : fatigue [Recent Change In Weight] : ~T recent weight change [Shortness Of Breath] : shortness of breath [Negative] : Allergic/Immunologic [FreeTextEntry7] : nausea [de-identified] : numbness/tingling, headaches

## 2021-11-23 NOTE — HISTORY OF PRESENT ILLNESS
[de-identified] : 26 year old woman with a long-standing history of morbid obesity, who has attempted numerous weight loss treatments without long term success. Patient is familiar with the Laparoscopic Adjustable Gastric Band, the Laparoscopic Sleeve Gastrectomy and the Laparoscopic Gastric Bypass. Patient presents today to discuss options for surgery.\par \par Patient has worked with Obesity Medicine but was unable to lose and maitain weight loss.\par

## 2021-11-23 NOTE — PHYSICAL EXAM
[Obese, well nourished, in no acute distress] : obese, well nourished, in no acute distress [Normal] : affect appropriate [de-identified] : obese, soft, nontender, no evidence of hernia

## 2021-11-23 NOTE — ASSESSMENT
[FreeTextEntry1] : 26 year old woman with long-standing history of morbid obesity presents today to discuss options for weight loss surgery.  I had an extensive discussion with the patient reviewing the Laparoscopic Sleeve Gastrectomy and Laparoscopic gastric Bypass. Diagrams were used. All questions were answered.  \par \par Complications were discussed including but not limited to: vitamin and protein deficiencies, dumping, pneumonia, urinary infection, wound infection, leaks/peritonitis possibly requiring intraabdominal drains or reoperation, bleeding, DVT, pulmonary embolus, severe reflux, sleeve obstruction, anastomotic ulcers, anastomotic strictures, abdominal wall hernias, internal hernias, revisions, death, inadequate weight loss. The importance of vitamins and protein supplementation was stressed, as was the importance of follow-up and exercise. \par \par Patient encouraged to make dietary and lifestyle changes in preparation for surgery.\par \par Patient was instructed to avoid pregnancy for 12-18 months post -op, until patient is at a stable weight, has normal vitamin levels and on prenatal vitamins. \par \par Patient with a long history of morbid obesity.She is interested in the Laparoscopic Sleeve Gastrectomy. She was given written material to review.  Pre-operative evaluations were reviewed. She will need to lose weight prior to surgery and will be seen again prior to surgery.She was told to call with any questions. \par \par Patient reports significant snoring will obtain sleep study, with BMI of 38 would need comorbidity to qualify for surgery.\par \par History of GERD on no meds will need to review EGD, possible reflux work up.

## 2022-01-13 ENCOUNTER — APPOINTMENT (OUTPATIENT)
Dept: BARIATRICS/WEIGHT MGMT | Facility: CLINIC | Age: 27
End: 2022-01-13
Payer: COMMERCIAL

## 2022-01-13 VITALS — HEIGHT: 63 IN | WEIGHT: 214 LBS | BODY MASS INDEX: 37.92 KG/M2

## 2022-01-13 DIAGNOSIS — Z78.9 OTHER SPECIFIED HEALTH STATUS: ICD-10-CM

## 2022-01-13 PROCEDURE — 90791 PSYCH DIAGNOSTIC EVALUATION: CPT | Mod: 95

## 2022-01-18 ENCOUNTER — APPOINTMENT (OUTPATIENT)
Dept: BARIATRICS | Facility: CLINIC | Age: 27
End: 2022-01-18
Payer: COMMERCIAL

## 2022-01-18 VITALS — HEIGHT: 63 IN | WEIGHT: 215 LBS | BODY MASS INDEX: 38.09 KG/M2

## 2022-01-18 DIAGNOSIS — Q89.2 CONGENITAL MALFORMATIONS OF OTHER ENDOCRINE GLANDS: ICD-10-CM

## 2022-01-18 DIAGNOSIS — J30.2 OTHER SEASONAL ALLERGIC RHINITIS: ICD-10-CM

## 2022-01-18 DIAGNOSIS — F41.9 ANXIETY DISORDER, UNSPECIFIED: ICD-10-CM

## 2022-01-18 DIAGNOSIS — F32.A ANXIETY DISORDER, UNSPECIFIED: ICD-10-CM

## 2022-01-18 PROCEDURE — 99214 OFFICE O/P EST MOD 30 MIN: CPT | Mod: 95

## 2022-01-18 RX ORDER — PHENTERMINE HYDROCHLORIDE 37.5 MG/1
37.5 TABLET ORAL
Qty: 30 | Refills: 0 | Status: DISCONTINUED | COMMUNITY
Start: 2020-09-10 | End: 2022-01-18

## 2022-01-18 RX ORDER — SEMAGLUTIDE 1.34 MG/ML
2 INJECTION, SOLUTION SUBCUTANEOUS
Qty: 4 | Refills: 1 | Status: DISCONTINUED | COMMUNITY
Start: 2020-08-12 | End: 2022-01-18

## 2022-01-19 NOTE — HISTORY OF PRESENT ILLNESS
[Home] : at home, [unfilled] , at the time of the visit. [Medical Office: ([facility])___] : at the medical office located in  [Verbal consent obtained from patient] : the patient, [unfilled] [de-identified] : 26 year old F undergoing workup for laparoscopic sleeve gastrectomy here for a follow up telemedicine visit. Patient is currently in the process of completing her workup as well as a medically supervised diet. Patient continues to make efforts to improve food choices and increased activity.Pt is following a protein focused diet - 3 meals a day - consuming a sufficient amount of zero calorie liquid. Pt is planning to have home sleep study completed within the next week. Pt is aware that  if she does not have sleep apnea she will not be a candidate for bariatric surgery. \par

## 2022-01-19 NOTE — ASSESSMENT
[FreeTextEntry1] : 26 year old F undergoing workup for laparoscopic sleeve gastrectomy here for a follow up telemedicine visit . Weight gain since last visit.\par \par Home sleep study completed within the next week. Pt is aware that if she does not have sleep apnea she will not be a candidate for bariatric surgery\par \par Pre op education classes completed. \par Nutrition one on one scheduled  on 3/22/22 Psych seen on 1/13/ 22 \par \par Plan to schedule GI consult and subsequent Upper EGD pending results of Home Sleep Study. \par Needs letter of support/ diet history / routine labs prior to surgery completed - discussed and reviewed results. \par Appointments  and testing with cardiology to be scheduled pending sleep study. \par \par Nutritional counseling has been provided. The patient is encouraged to remain calorie conscious and continue a low fat, low carbohydrate, protein focus diet. Pt encouraged to participate in a daily exercise regimen incorporating cardio and strength training. \par \par Return to office after completion of sleep study. \par \par Plan to follow up with obesity medicine / weight management - Dr. Toth if patient is not a candidate for bariatric surgery. \par

## 2022-01-19 NOTE — REVIEW OF SYSTEMS
[Recent Change In Weight] : ~T recent weight change [Negative] : Musculoskeletal [Fever] : no fever [FreeTextEntry2] : weight gain since last visit.

## 2022-02-03 ENCOUNTER — APPOINTMENT (OUTPATIENT)
Dept: BARIATRICS/WEIGHT MGMT | Facility: CLINIC | Age: 27
End: 2022-02-03

## 2022-03-22 ENCOUNTER — APPOINTMENT (OUTPATIENT)
Dept: BARIATRICS/WEIGHT MGMT | Facility: CLINIC | Age: 27
End: 2022-03-22
Payer: SELF-PAY

## 2022-03-22 PROCEDURE — 97802 MEDICAL NUTRITION INDIV IN: CPT | Mod: 95

## 2022-03-24 ENCOUNTER — APPOINTMENT (OUTPATIENT)
Dept: GASTROENTEROLOGY | Facility: CLINIC | Age: 27
End: 2022-03-24

## 2022-03-24 VITALS — HEIGHT: 63 IN | WEIGHT: 223 LBS | BODY MASS INDEX: 39.51 KG/M2

## 2022-05-05 RX ORDER — MULTIVITAMIN
TABLET ORAL
Refills: 0 | Status: DISCONTINUED | COMMUNITY
End: 2022-05-05

## 2022-05-06 ENCOUNTER — NON-APPOINTMENT (OUTPATIENT)
Age: 27
End: 2022-05-06

## 2022-05-06 DIAGNOSIS — Z01.818 ENCOUNTER FOR OTHER PREPROCEDURAL EXAMINATION: ICD-10-CM

## 2022-05-06 DIAGNOSIS — R63.5 ABNORMAL WEIGHT GAIN: ICD-10-CM

## 2022-06-01 ENCOUNTER — NON-APPOINTMENT (OUTPATIENT)
Age: 27
End: 2022-06-01

## 2022-06-02 ENCOUNTER — APPOINTMENT (OUTPATIENT)
Dept: BARIATRICS | Facility: CLINIC | Age: 27
End: 2022-06-02
Payer: COMMERCIAL

## 2022-06-02 VITALS
BODY MASS INDEX: 40.23 KG/M2 | OXYGEN SATURATION: 98 % | HEIGHT: 63 IN | SYSTOLIC BLOOD PRESSURE: 120 MMHG | WEIGHT: 227.07 LBS | TEMPERATURE: 96.5 F | DIASTOLIC BLOOD PRESSURE: 80 MMHG | HEART RATE: 96 BPM

## 2022-06-02 PROBLEM — R63.5 WEIGHT GAIN: Status: ACTIVE | Noted: 2021-11-22

## 2022-06-02 PROBLEM — Z01.818 PRE-OP TESTING: Status: ACTIVE | Noted: 2021-11-22

## 2022-06-02 PROCEDURE — 99214 OFFICE O/P EST MOD 30 MIN: CPT

## 2022-06-02 NOTE — PHYSICAL EXAM
[de-identified] : soft, NT, ND, normoactive bowel sounds, no hepatosplenomegaly or masses or diastasis appreciated

## 2022-06-02 NOTE — ASSESSMENT
[FreeTextEntry1] : 27 year old F completed workup for Laparoscopic Sleeve Gastrectomy. Current wt 227 lbs, weight stable since last visit. Pt is on OCP Junel FE since 14yo for PCOS and concerned if off of medication. Otherwise pt is doing well overall.\par \par Patient re-educated on dietary and lifestyle changes in preparation for surgery\par Protein focus diet and increase zero calorie liquid intake per day \par Increase activities and keep track of steps - goal 8-10k steps/day, recommended step tracker\par Limit caffeine intake\par Schedule OR date - \par Scopolamine patch \par Modified liquid diet 2 weeks and then clear liquids day before surgery\par Advised to monitor bowel habits during perioperative period and use constipation remedies as needed\par Reviewed pt's meds, all meds meet pill size requirements post op, pt will have PCP prescribe alternatives\par \par Spoke to Dann REDMAN at GI Dr. Snyder's office that confirmed pt has Fibroscan ordered but pt did not show up for study. Fibroscan can be done either pre or post op. Also no hepatic biopsy is needed.\par \par Labs performed 12/2/2021, results reviewed with pt - no significant abnormalities, vitamin levels normal \par All questions answered \par \par \par Pt seen with Dr. French\par \par Additional time spent before and after visit reviewing chart

## 2022-06-02 NOTE — HISTORY OF PRESENT ILLNESS
[de-identified] : 27 year old F completed workup for Laparoscopic Sleeve Gastrectomy. Current wt 227 lbs, weight stable since last visit 1/18/2022. Pt continues to try to make good food choices, consuming 3 meals/day with protein and vegetables. Trying to drink zero calorie liquid 64oz/day. Pt is trying to exercise. No abdominal pain, reflux, nausea, vomiting, constipation or diarrhea. Pt is on OCP Junel FE since 14yo for PCOS and concerned about stopping medication. Pt adds has concern for postop nausea.\par \par Spoke to Dann REDMAN at GI Dr. Snyder's office that confirmed pt has Fibroscan ordered but pt did not show up for study. Fibroscan can be done either pre or post op. Also no hepatic biopsy is needed.

## 2022-06-06 ENCOUNTER — OUTPATIENT (OUTPATIENT)
Dept: OUTPATIENT SERVICES | Facility: HOSPITAL | Age: 27
LOS: 1 days | End: 2022-06-06
Payer: COMMERCIAL

## 2022-06-06 VITALS
WEIGHT: 226.86 LBS | HEART RATE: 98 BPM | SYSTOLIC BLOOD PRESSURE: 127 MMHG | RESPIRATION RATE: 16 BRPM | DIASTOLIC BLOOD PRESSURE: 83 MMHG | OXYGEN SATURATION: 100 % | TEMPERATURE: 97 F | HEIGHT: 63 IN

## 2022-06-06 DIAGNOSIS — Z90.89 ACQUIRED ABSENCE OF OTHER ORGANS: Chronic | ICD-10-CM

## 2022-06-06 DIAGNOSIS — J35.3 HYPERTROPHY OF TONSILS WITH HYPERTROPHY OF ADENOIDS: Chronic | ICD-10-CM

## 2022-06-06 DIAGNOSIS — E66.01 MORBID (SEVERE) OBESITY DUE TO EXCESS CALORIES: ICD-10-CM

## 2022-06-06 DIAGNOSIS — Z01.818 ENCOUNTER FOR OTHER PREPROCEDURAL EXAMINATION: ICD-10-CM

## 2022-06-06 DIAGNOSIS — Z87.798 PERSONAL HISTORY OF OTHER (CORRECTED) CONGENITAL MALFORMATIONS: Chronic | ICD-10-CM

## 2022-06-06 LAB
ANION GAP SERPL CALC-SCNC: 11 MMOL/L — SIGNIFICANT CHANGE UP (ref 5–17)
BLD GP AB SCN SERPL QL: SIGNIFICANT CHANGE UP
BUN SERPL-MCNC: 10 MG/DL — SIGNIFICANT CHANGE UP (ref 7–23)
CALCIUM SERPL-MCNC: 10 MG/DL — SIGNIFICANT CHANGE UP (ref 8.4–10.5)
CHLORIDE SERPL-SCNC: 104 MMOL/L — SIGNIFICANT CHANGE UP (ref 96–108)
CO2 SERPL-SCNC: 23 MMOL/L — SIGNIFICANT CHANGE UP (ref 22–31)
CREAT SERPL-MCNC: 0.89 MG/DL — SIGNIFICANT CHANGE UP (ref 0.5–1.3)
EGFR: 92 ML/MIN/1.73M2 — SIGNIFICANT CHANGE UP
GLUCOSE SERPL-MCNC: 173 MG/DL — HIGH (ref 70–99)
HCT VFR BLD CALC: 42.7 % — SIGNIFICANT CHANGE UP (ref 34.5–45)
HGB BLD-MCNC: 14.2 G/DL — SIGNIFICANT CHANGE UP (ref 11.5–15.5)
MCHC RBC-ENTMCNC: 29.4 PG — SIGNIFICANT CHANGE UP (ref 27–34)
MCHC RBC-ENTMCNC: 33.3 GM/DL — SIGNIFICANT CHANGE UP (ref 32–36)
MCV RBC AUTO: 88.4 FL — SIGNIFICANT CHANGE UP (ref 80–100)
NRBC # BLD: 0 /100 WBCS — SIGNIFICANT CHANGE UP (ref 0–0)
PLATELET # BLD AUTO: 251 K/UL — SIGNIFICANT CHANGE UP (ref 150–400)
POTASSIUM SERPL-MCNC: 5 MMOL/L — SIGNIFICANT CHANGE UP (ref 3.5–5.3)
POTASSIUM SERPL-SCNC: 5 MMOL/L — SIGNIFICANT CHANGE UP (ref 3.5–5.3)
RBC # BLD: 4.83 M/UL — SIGNIFICANT CHANGE UP (ref 3.8–5.2)
RBC # FLD: 12.1 % — SIGNIFICANT CHANGE UP (ref 10.3–14.5)
SODIUM SERPL-SCNC: 138 MMOL/L — SIGNIFICANT CHANGE UP (ref 135–145)
WBC # BLD: 7.45 K/UL — SIGNIFICANT CHANGE UP (ref 3.8–10.5)
WBC # FLD AUTO: 7.45 K/UL — SIGNIFICANT CHANGE UP (ref 3.8–10.5)

## 2022-06-06 PROCEDURE — G0463: CPT

## 2022-06-06 NOTE — H&P PST ADULT - NS PRO LAST MENSTRUAL DATE
Your Diagnosis is: Cerumen impaction  (earwax)    Return to the Emergency department or for any other issues or concerns.    Your new prescriptions are: Debrox    Procedures done today: cleaned earwax    Additional instructions: follow up with your child's doctor .          Earwax Removal    The ear canal makes earwax from the canal’s lining. The ears make wax to lubricate and protect the ear canal. The ear canal is the tube that connects the middle ear to the outside of the ear. The wax protects the ear from bacteria, infection, and damage from water or trauma.  The wax that forms in the canal naturally moves toward the outside of the ear and falls out. In some cases, the ear may make too much wax. If the wax causes problems or keeps the healthcare provider from seeing into the ear, the extra wax may be removed.  Too much wax can affect your hearing. It can cause itching. In rare cases, it can be painful. Earwax should not be removed unless it is causing a problem. You should not stick objects into your ear to remove wax unless told to do so by your healthcare provider.  Healthcare providers can remove earwax safely. It is important to stay still during the procedure to avoid damage to the ear canal. But removing earwax generally doesn’t hurt. You will not usually need anesthesia or pain medicine when the provider removes the earwax.  A number of conditions lead to earwax buildup. These include some skin problems, a narrow ear canal, or ears that make too much earwax. Using cotton swabs in the canal pushes earwax deeper into the ear and contributes to the buildup of earwax.  Home care  · The healthcare provider may recommend mineral oil or an over-the-counter eardrop to use at home to soften the earwax. Use these products only if the provider recommends them. Use these products only if the provider recommends them. Carefully follow the instructions given.  · Don’t use mineral oil or OTC eardrops if you might have  an ear infection or a ruptured eardrum. Tell your healthcare provider right away if you have diabetes or an immune disorder.  · Don’t use cotton swabs in your ears. Cotton swabs may push wax deeper into the ear canal or damage the eardrum. Use cotton gauze or a wet washcloth  to gently remove wax on the outside of the ear and around the opening to the ear canal.  · Don't use any probing device or object such as cotton-tipped swabs or isabel pins to clean the inside of your ears.  · Don’t use ear candles to clean your ears. Candling can be dangerous. It can burn the ear canal. It can also make the condition worse instead of better.  · Don’t use cold water to rinse the ear. This will make you dizzy. If your provider tells you to rinse your ear, use only warm water or follow his or her instructions.  · Check the ear for signs of infection or irritation listed below under When to seek medical advice.  Steps for using eardrops  1. Warm the medicine bottle by rubbing it between your hands for a few minutes.  2. Lie down on your side, with the affected ear up.  3. Place the recommended number of drops in the ear. Wet a cotton ball with the medicine. Gently put the cotton ball into the ear opening.  Follow-up care  Follow up with your healthcare provider, or as directed.  When to seek medical advice  Call the provider right away if you have:  · Ear pain that gets worse  · Fever of 100.4F°F (38°C) or higher, or as directed by your healthcare provider  · Worsening wax buildup  · Severe pain, dizziness, or nausea  · Bleeding from the ear  · Hearing problems  · Signs of irritation from the eardrops, such as burning, stinging, or swelling and tenderness  · Foul-smelling fluid draining from the ear  · Swelling, redness, or tenderness of the outer ear  · Headache, neck pain, or stiff neck  © 4097-7226 [employer]. [address] [place] [address], [place] [address]. All rights reserved. This information is not intended as a  substitute for professional medical care. Always follow your healthcare professional's instructions.         May 2022

## 2022-06-06 NOTE — H&P PST ADULT - FALL HARM RISK - UNIVERSAL INTERVENTIONS
Bed in lowest position, wheels locked, appropriate side rails in place/Call bell, personal items and telephone in reach/Instruct patient to call for assistance before getting out of bed or chair/Non-slip footwear when patient is out of bed/[place] to call system/Physically safe environment - no spills, clutter or unnecessary equipment/Purposeful Proactive Rounding/Room/bathroom lighting operational, light cord in reach

## 2022-06-06 NOTE — H&P PST ADULT - NSICDXFAMILYHX_GEN_ALL_CORE_FT
FAMILY HISTORY:  Father  Still living? Yes, Estimated age: 61-70  Family history of hypertension in father, Age at diagnosis: Age Unknown  FHx: diverticulitis, Age at diagnosis: Age Unknown    Mother  Still living? Yes, Estimated age: 61-70  Family history of coronary artery disease in mother, Age at diagnosis: Age Unknown  Family history of hyperlipidemia, Age at diagnosis: Age Unknown    Sibling  Still living? Yes, Estimated age: 31-40  Family history of epilepsy in brother, Age at diagnosis: Age Unknown  History of hypertension in sibling, Age at diagnosis: Age Unknown

## 2022-06-06 NOTE — H&P PST ADULT - ASSESSMENT
28yo female patient scheduled for surgery on 6/22/22. She will obtain Medical Clearance from her PMD. She will be NPO as per Anesthesia and will take no meds on AM of surgery. Instructions reviewed and questions addressed. As per protocol she will be screened for Covid19 pre-op.

## 2022-06-06 NOTE — H&P PST ADULT - NSANTHOSAYNRD_GEN_A_CORE
No. NAVI screening performed.  STOP BANG Legend: 0-2 = LOW Risk; 3-4 = INTERMEDIATE Risk; 5-8 = HIGH Risk

## 2022-06-06 NOTE — H&P PST ADULT - NSICDXPASTMEDICALHX_GEN_ALL_CORE_FT
PAST MEDICAL HISTORY:  2019 novel coronavirus disease (COVID-19) 11/2021- moderate sx- loss of sense of taste and smell, SOB, fever, cough, body aches- not hospitalized    Class 3 severe obesity with body mass index (BMI) of 40.0 to 44.9 in adult     Disc disorder of cervical region 3 bulging discs    GERD (gastroesophageal reflux disease)     Hiatal hernia     HLD (hyperlipidemia)     IBS (irritable bowel syndrome)     PCOS (polycystic ovarian syndrome)     Seasonal allergies     Thyroglossal duct cyst      PAST MEDICAL HISTORY:  2019 novel coronavirus disease (COVID-19) 11/2021- moderate sx- loss of sense of taste and smell, SOB, fever, cough, body aches- not hospitalized    Class 3 severe obesity with body mass index (BMI) of 40.0 to 44.9 in adult     Disc disorder of cervical region 3 bulging discs    GERD (gastroesophageal reflux disease)     Hiatal hernia     HLD (hyperlipidemia) atorvastatin prescribed- has not started taking    IBS (irritable bowel syndrome)     PCOS (polycystic ovarian syndrome)     Seasonal allergies     Thyroglossal duct cyst      PAST MEDICAL HISTORY:  2019 novel coronavirus disease (COVID-19) 11/2021- moderate sx- loss of sense of taste and smell, SOB, fever, cough, body aches- not hospitalized    Anxiety and depression     Class 3 severe obesity with body mass index (BMI) of 40.0 to 44.9 in adult     Cyst of spleen     Disc disorder of cervical region 3 bulging discs    GERD (gastroesophageal reflux disease)     Hiatal hernia     HLD (hyperlipidemia) atorvastatin prescribed- has not started taking    IBS (irritable bowel syndrome)     Migraine headache     NAFLD (nonalcoholic fatty liver disease)     Ovarian cyst     PCOS (polycystic ovarian syndrome)     Seasonal allergies     Thyroglossal duct cyst

## 2022-06-06 NOTE — H&P PST ADULT - NSICDXPASTSURGICALHX_GEN_ALL_CORE_FT
PAST SURGICAL HISTORY:  History of thyroglossal duct cyst removal 2014    S/P tonsillectomy and adenoidectomy 2001

## 2022-06-08 RX ORDER — SODIUM CHLORIDE 9 MG/ML
2000 INJECTION, SOLUTION INTRAVENOUS
Refills: 0 | Status: DISCONTINUED | OUTPATIENT
Start: 2022-06-22 | End: 2022-06-23

## 2022-06-08 RX ORDER — PANTOPRAZOLE SODIUM 20 MG/1
40 TABLET, DELAYED RELEASE ORAL DAILY
Refills: 0 | Status: DISCONTINUED | OUTPATIENT
Start: 2022-06-22 | End: 2022-06-23

## 2022-06-08 RX ORDER — ENOXAPARIN SODIUM 100 MG/ML
40 INJECTION SUBCUTANEOUS EVERY 12 HOURS
Refills: 0 | Status: DISCONTINUED | OUTPATIENT
Start: 2022-06-23 | End: 2022-06-23

## 2022-06-08 RX ORDER — HYDROMORPHONE HYDROCHLORIDE 2 MG/ML
0.5 INJECTION INTRAMUSCULAR; INTRAVENOUS; SUBCUTANEOUS EVERY 4 HOURS
Refills: 0 | Status: DISCONTINUED | OUTPATIENT
Start: 2022-06-22 | End: 2022-06-23

## 2022-06-08 RX ORDER — ONDANSETRON 8 MG/1
4 TABLET, FILM COATED ORAL EVERY 6 HOURS
Refills: 0 | Status: DISCONTINUED | OUTPATIENT
Start: 2022-06-22 | End: 2022-06-23

## 2022-06-08 RX ORDER — HYOSCYAMINE SULFATE 0.13 MG
0.12 TABLET ORAL EVERY 6 HOURS
Refills: 0 | Status: DISCONTINUED | OUTPATIENT
Start: 2022-06-22 | End: 2022-06-23

## 2022-06-08 RX ORDER — SODIUM CHLORIDE 9 MG/ML
1000 INJECTION, SOLUTION INTRAVENOUS
Refills: 0 | Status: DISCONTINUED | OUTPATIENT
Start: 2022-06-22 | End: 2022-06-23

## 2022-06-13 DIAGNOSIS — K76.0 FATTY (CHANGE OF) LIVER, NOT ELSEWHERE CLASSIFIED: ICD-10-CM

## 2022-06-13 RX ORDER — OMEPRAZOLE 40 MG/1
40 CAPSULE, DELAYED RELEASE ORAL DAILY
Qty: 30 | Refills: 3 | Status: DISCONTINUED | COMMUNITY
Start: 2022-06-13 | End: 2022-06-13

## 2022-06-18 ENCOUNTER — NON-APPOINTMENT (OUTPATIENT)
Age: 27
End: 2022-06-18

## 2022-06-21 ENCOUNTER — TRANSCRIPTION ENCOUNTER (OUTPATIENT)
Age: 27
End: 2022-06-21

## 2022-06-22 ENCOUNTER — INPATIENT (INPATIENT)
Facility: HOSPITAL | Age: 27
LOS: 0 days | Discharge: HOME | DRG: 621 | End: 2022-06-23
Attending: SURGERY | Admitting: SURGERY
Payer: COMMERCIAL

## 2022-06-22 ENCOUNTER — TRANSCRIPTION ENCOUNTER (OUTPATIENT)
Age: 27
End: 2022-06-22

## 2022-06-22 ENCOUNTER — APPOINTMENT (OUTPATIENT)
Dept: BARIATRICS | Facility: HOSPITAL | Age: 27
End: 2022-06-22
Payer: COMMERCIAL

## 2022-06-22 ENCOUNTER — RESULT REVIEW (OUTPATIENT)
Age: 27
End: 2022-06-22

## 2022-06-22 VITALS
TEMPERATURE: 98 F | OXYGEN SATURATION: 97 % | HEART RATE: 93 BPM | HEIGHT: 63 IN | WEIGHT: 218.7 LBS | DIASTOLIC BLOOD PRESSURE: 77 MMHG | SYSTOLIC BLOOD PRESSURE: 113 MMHG | RESPIRATION RATE: 19 BRPM

## 2022-06-22 DIAGNOSIS — E66.01 MORBID (SEVERE) OBESITY DUE TO EXCESS CALORIES: ICD-10-CM

## 2022-06-22 DIAGNOSIS — Z90.89 ACQUIRED ABSENCE OF OTHER ORGANS: Chronic | ICD-10-CM

## 2022-06-22 DIAGNOSIS — Z87.798 PERSONAL HISTORY OF OTHER (CORRECTED) CONGENITAL MALFORMATIONS: Chronic | ICD-10-CM

## 2022-06-22 LAB
ABO RH CONFIRMATION: SIGNIFICANT CHANGE UP
HCG UR QL: NEGATIVE — SIGNIFICANT CHANGE UP

## 2022-06-22 PROCEDURE — 43775 LAP SLEEVE GASTRECTOMY: CPT | Mod: AS,22

## 2022-06-22 PROCEDURE — 88307 TISSUE EXAM BY PATHOLOGIST: CPT | Mod: 26

## 2022-06-22 PROCEDURE — 43775 LAP SLEEVE GASTRECTOMY: CPT | Mod: 22

## 2022-06-22 PROCEDURE — 99221 1ST HOSP IP/OBS SF/LOW 40: CPT

## 2022-06-22 DEVICE — STAPLER COVIDIEN TRI-STAPLE 45MM PURPLE INTELLIGENT RELOAD: Type: IMPLANTABLE DEVICE | Status: FUNCTIONAL

## 2022-06-22 DEVICE — STAPLER COVIDIEN TRI-STAPLE 45MM BLACK INTELLIGENT RELOAD: Type: IMPLANTABLE DEVICE | Status: FUNCTIONAL

## 2022-06-22 DEVICE — STAPLER COVIDIEN TRI-STAPLE 60MM PURPLE INTELLIGENT RELOAD: Type: IMPLANTABLE DEVICE | Status: FUNCTIONAL

## 2022-06-22 DEVICE — CLIP APPLIER ETHICON LIGAMAX 5MM: Type: IMPLANTABLE DEVICE | Status: FUNCTIONAL

## 2022-06-22 DEVICE — SEALANT VISTASEAL FIBRIN HUMAN 4ML: Type: IMPLANTABLE DEVICE | Status: FUNCTIONAL

## 2022-06-22 DEVICE — STAPLER COVIDIEN TRI-STAPLE 60MM BLACK INTELLIGENT RELOAD: Type: IMPLANTABLE DEVICE | Status: FUNCTIONAL

## 2022-06-22 RX ORDER — ACETAMINOPHEN 500 MG
1000 TABLET ORAL ONCE
Refills: 0 | Status: COMPLETED | OUTPATIENT
Start: 2022-06-22 | End: 2022-06-22

## 2022-06-22 RX ORDER — NORETHINDRONE AND ETHINYL ESTRADIOL 0.4-0.035
1 KIT ORAL
Qty: 0 | Refills: 0 | DISCHARGE

## 2022-06-22 RX ORDER — APREPITANT 80 MG/1
40 CAPSULE ORAL ONCE
Refills: 0 | Status: COMPLETED | OUTPATIENT
Start: 2022-06-22 | End: 2022-06-22

## 2022-06-22 RX ORDER — ONDANSETRON 8 MG/1
4 TABLET, FILM COATED ORAL ONCE
Refills: 0 | Status: DISCONTINUED | OUTPATIENT
Start: 2022-06-22 | End: 2022-06-22

## 2022-06-22 RX ORDER — ENOXAPARIN SODIUM 100 MG/ML
40 INJECTION SUBCUTANEOUS ONCE
Refills: 0 | Status: COMPLETED | OUTPATIENT
Start: 2022-06-22 | End: 2022-06-22

## 2022-06-22 RX ORDER — PANTOPRAZOLE SODIUM 20 MG/1
1 TABLET, DELAYED RELEASE ORAL
Qty: 0 | Refills: 0 | DISCHARGE

## 2022-06-22 RX ORDER — ACETAMINOPHEN 500 MG
1000 TABLET ORAL EVERY 6 HOURS
Refills: 0 | Status: COMPLETED | OUTPATIENT
Start: 2022-06-22 | End: 2022-06-23

## 2022-06-22 RX ORDER — ACETAMINOPHEN 500 MG
30 TABLET ORAL
Qty: 0 | Refills: 0 | DISCHARGE

## 2022-06-22 RX ORDER — CHLORHEXIDINE GLUCONATE 213 G/1000ML
1 SOLUTION TOPICAL ONCE
Refills: 0 | Status: COMPLETED | OUTPATIENT
Start: 2022-06-22 | End: 2022-06-22

## 2022-06-22 RX ORDER — SODIUM CHLORIDE 9 MG/ML
1000 INJECTION, SOLUTION INTRAVENOUS
Refills: 0 | Status: DISCONTINUED | OUTPATIENT
Start: 2022-06-22 | End: 2022-06-22

## 2022-06-22 RX ORDER — ACETAMINOPHEN 500 MG
1000 TABLET ORAL EVERY 6 HOURS
Refills: 0 | Status: COMPLETED | OUTPATIENT
Start: 2022-06-23 | End: 2022-06-23

## 2022-06-22 RX ORDER — OXYCODONE HYDROCHLORIDE 5 MG/1
1 TABLET ORAL
Qty: 0 | Refills: 0 | DISCHARGE

## 2022-06-22 RX ORDER — ONDANSETRON 8 MG/1
1 TABLET, FILM COATED ORAL
Qty: 0 | Refills: 0 | DISCHARGE

## 2022-06-22 RX ORDER — ENOXAPARIN SODIUM 100 MG/ML
40 INJECTION SUBCUTANEOUS
Qty: 0 | Refills: 0 | DISCHARGE

## 2022-06-22 RX ORDER — IBUPROFEN 200 MG
800 TABLET ORAL EVERY 6 HOURS
Refills: 0 | Status: DISCONTINUED | OUTPATIENT
Start: 2022-06-22 | End: 2022-06-23

## 2022-06-22 RX ORDER — HYDROMORPHONE HYDROCHLORIDE 2 MG/ML
0.5 INJECTION INTRAMUSCULAR; INTRAVENOUS; SUBCUTANEOUS
Refills: 0 | Status: DISCONTINUED | OUTPATIENT
Start: 2022-06-22 | End: 2022-06-22

## 2022-06-22 RX ORDER — CEFAZOLIN SODIUM 1 G
2000 VIAL (EA) INJECTION ONCE
Refills: 0 | Status: COMPLETED | OUTPATIENT
Start: 2022-06-22 | End: 2022-06-22

## 2022-06-22 RX ORDER — OMEPRAZOLE 10 MG/1
1 CAPSULE, DELAYED RELEASE ORAL
Qty: 0 | Refills: 0 | DISCHARGE

## 2022-06-22 RX ADMIN — APREPITANT 40 MILLIGRAM(S): 80 CAPSULE ORAL at 07:02

## 2022-06-22 RX ADMIN — HYDROMORPHONE HYDROCHLORIDE 0.5 MILLIGRAM(S): 2 INJECTION INTRAMUSCULAR; INTRAVENOUS; SUBCUTANEOUS at 11:42

## 2022-06-22 RX ADMIN — HYDROMORPHONE HYDROCHLORIDE 0.5 MILLIGRAM(S): 2 INJECTION INTRAMUSCULAR; INTRAVENOUS; SUBCUTANEOUS at 12:22

## 2022-06-22 RX ADMIN — Medication 400 MILLIGRAM(S): at 15:03

## 2022-06-22 RX ADMIN — Medication 400 MILLIGRAM(S): at 11:30

## 2022-06-22 RX ADMIN — HYDROMORPHONE HYDROCHLORIDE 0.5 MILLIGRAM(S): 2 INJECTION INTRAMUSCULAR; INTRAVENOUS; SUBCUTANEOUS at 22:15

## 2022-06-22 RX ADMIN — SODIUM CHLORIDE 1000 MILLILITER(S): 9 INJECTION, SOLUTION INTRAVENOUS at 07:42

## 2022-06-22 RX ADMIN — SODIUM CHLORIDE 150 MILLILITER(S): 9 INJECTION, SOLUTION INTRAVENOUS at 21:12

## 2022-06-22 RX ADMIN — ENOXAPARIN SODIUM 40 MILLIGRAM(S): 100 INJECTION SUBCUTANEOUS at 08:33

## 2022-06-22 RX ADMIN — HYDROMORPHONE HYDROCHLORIDE 0.5 MILLIGRAM(S): 2 INJECTION INTRAMUSCULAR; INTRAVENOUS; SUBCUTANEOUS at 21:58

## 2022-06-22 RX ADMIN — CHLORHEXIDINE GLUCONATE 1 APPLICATION(S): 213 SOLUTION TOPICAL at 07:01

## 2022-06-22 RX ADMIN — Medication 1000 MILLIGRAM(S): at 21:19

## 2022-06-22 RX ADMIN — SODIUM CHLORIDE 150 MILLILITER(S): 9 INJECTION, SOLUTION INTRAVENOUS at 13:40

## 2022-06-22 RX ADMIN — Medication 400 MILLIGRAM(S): at 21:12

## 2022-06-22 RX ADMIN — Medication 1000 MILLIGRAM(S): at 15:06

## 2022-06-22 RX ADMIN — HYDROMORPHONE HYDROCHLORIDE 0.5 MILLIGRAM(S): 2 INJECTION INTRAMUSCULAR; INTRAVENOUS; SUBCUTANEOUS at 16:25

## 2022-06-22 RX ADMIN — Medication 800 MILLIGRAM(S): at 12:22

## 2022-06-22 RX ADMIN — ONDANSETRON 4 MILLIGRAM(S): 8 TABLET, FILM COATED ORAL at 17:12

## 2022-06-22 RX ADMIN — SODIUM CHLORIDE 75 MILLILITER(S): 9 INJECTION, SOLUTION INTRAVENOUS at 11:40

## 2022-06-22 RX ADMIN — HYDROMORPHONE HYDROCHLORIDE 0.5 MILLIGRAM(S): 2 INJECTION INTRAMUSCULAR; INTRAVENOUS; SUBCUTANEOUS at 16:44

## 2022-06-22 NOTE — DISCHARGE NOTE PROVIDER - NSDCFUADDAPPT_GEN_ALL_CORE_FT
Please call Dr. INDIA French's office ([phone]) to schedule a follow-up appointment to be seen in 1 week.    You have a scopolamine patch in place, remove scopolamine patch on 6/25/2022 and fold over on itself and discard.  Be sure not to touch the inside of the patch when removing it.

## 2022-06-22 NOTE — CONSULT NOTE ADULT - ASSESSMENT
S/P gastric sleeve     Aftercare following surgery  -Early ambulation  -Pain management  -Incentive Spirometry  -DVT ppx per primary team    GERD  PPI

## 2022-06-22 NOTE — PATIENT PROFILE ADULT - NSPRESCRUSEDDRG_GEN_A_NUR
Screening Questionnaire for Pediatric Immunization     Is the child sick today?   No    Does the child have allergies to medications, food a vaccine component, or latex?   No    Has the child had a serious reaction to a vaccine in the past?   No    Has the child had a health problem with lung, heart, kidney or metabolic disease (e.g., diabetes), asthma, or a blood disorder?  Is he/she on long-term aspirin therapy?   No    If the child to be vaccinated is 2 through 4 years of age, has a healthcare provider told you that the child had wheezing or asthma in the  past 12 months?   No   If your child is a baby, have you ever been told he or she has had intussusception ?   No    Has the child, sibling or parent had a seizure, has the child had brain or other nervous system problems?   No    Does the child have cancer, leukemia, AIDS, or any immune system          problem?   No    In the past 3 months, has the child taken medications that affect the immune system such as prednisone, other steroids, or anticancer drugs; drugs for the treatment of rheumatoid arthritis, Crohn s disease, or psoriasis; or had radiation treatments?   No   In the past year, has the child received a transfusion of blood or blood products, or been given immune (gamma) globulin or an antiviral drug?   No    Is the child/teen pregnant or is there a chance that she could become         pregnant during the next month?   No    Has the child received any vaccinations in the past 4 weeks?   No      Immunization questionnaire answers were all negative.      [facility] does apply for the following reason:  [place] [employer] [place] Program (MHCP[place] enrollee: [place] Medical Assistance ([place]), [facility], or a Prepaid Medical Assistance Program (PMAP) (ages covered = 0-18).    [facility] eligibility self-screening form given to patient.    Per orders of symone winters, injection of tdap given by Funmilayo Cid. Patient instructed to remain in clinic for 20 minutes  afterwards, and to report any adverse reaction to me immediately.    Screening performed by Funmilayo Cid on 1/5/2017 at 5:23 PM.     No

## 2022-06-22 NOTE — DISCHARGE NOTE PROVIDER - HOSPITAL COURSE
27 year old female underwent laparoscopic sleeve gastrectomy with hiatal hernia repair. Patient tolerated procedure well and progressed appropriately. Currently tolerating Bariatric Phase 1 Clears diet, voiding independently with appropriate volume and ambulating. Nutritional guidelines were reviewed with Nutritionist and patient instructed to drink small frequent amounts, start protein drinks and follow dietary guidelines.  Discharged on 6/23/2022 with home medications, incentive spirometer and PRESCRIPTIONS/MEDS TO BED to follow-up with Bariatric Surgeon/Dr. INDIA French in 1 week.

## 2022-06-22 NOTE — CONSULT NOTE ADULT - SUBJECTIVE AND OBJECTIVE BOX
Patient is a 27y old  Female who presents with a chief complaint of laparoscopic sleeve gastrectomy with hiatal hernia repair (2022 11:00)      FROM ADMISSION H+P:   HPI: 28yo female patient who has opted to have sleeve gastrectomy. Patient seen post op. Tolerated procedure well without complications. C/o mild pain at surgical site. Denies any new complaints.      ----  PAST MEDICAL & SURGICAL HISTORY:  Thyroglossal duct cyst      Disc disorder of cervical region  3 bulging discs      IBS (irritable bowel syndrome)      Hiatal hernia      Seasonal allergies      PCOS (polycystic ovarian syndrome)      GERD (gastroesophageal reflux disease)      HLD (hyperlipidemia)  atorvastatin prescribed- has not started taking      2019 novel coronavirus disease (COVID-19)  2021- moderate sx- loss of sense of taste and smell, SOB, fever, cough, body aches- not hospitalized      Class 3 severe obesity with body mass index (BMI) of 40.0 to 44.9 in adult      Ovarian cyst      Cyst of spleen      Migraine headache      Anxiety and depression      NAFLD (nonalcoholic fatty liver disease)      S/P tonsillectomy and adenoidectomy        History of thyroglossal duct cyst removal            ----  Home Medications:  acetaminophen 500 mg/15 mL oral liquid: 30 milliliter(s) orally every 8 hours, for a minimum of 3 days and a maximum of 5 days (2022 11:08)  Junel  oral tablet: 1 tab(s) orally once a day (2022 06:54)  Lovenox 40 mg/0.4 mL injectable solution: 40 milligram(s) injectable once a day for 14 days (2022 11:08)  omeprazole 20 mg oral delayed release capsule: 1 cap(s) orally once a day, open capsule and sprinkle on spoon of yogurt or pudding (2022 11:08)  ondansetron 4 mg oral tablet, disintegratin tab(s) orally 3 times a day, As Needed - for nausea (2022 11:08)  oxyCODONE 5 mg oral tablet: 1 tab(s) orally every 6 hours, As Needed - for severe pain (2022 11:08)    ----  FAMILY HISTORY:  Family history of hypertension in father (Father)    FHx: diverticulitis (Father)    Family history of coronary artery disease in mother (Mother)    Family history of hyperlipidemia (Mother)    History of hypertension in sibling (Sibling)    Family history of epilepsy in brother (Sibling)        ----  Allergies    No Known Allergies    Intolerances    Avelox (Anaphylaxis)  Levaquin (Anaphylaxis)      ----  Social History:  Denies current alcohol, tobacco or drug use     ----  REVIEW OF SYSTEMS:  CONSTITUTIONAL: denies fever, chills, fatigue, weakness  HEENT: denies blurred vision, sore throat  SKIN: denies new lesions, rash  CARDIOVASCULAR: denies chest pain, chest pressure, palpitations  RESPIRATORY: denies shortness of breath, sputum production  GASTROINTESTINAL: denies nausea, vomiting, diarrhea, abdominal pain  GENITOURINARY: denies dysuria, discharge  NEUROLOGICAL: denies numbness, headache, focal weakness  MUSCULOSKELETAL: denies new joint pain, muscle aches  HEMATOLOGIC: denies gross bleeding, bruising    ----  PHYSICAL EXAM:  GENERAL: patient appears well, no acute distress, appropriately interactive  EYES: sclera clear, no exudates  LUNGS: good air entry bilaterally, clear to auscultation, symmetric breath sounds  HEART: soft S1/S2, regular rate and rhythm, no murmurs noted, no noted edema to bilateral lower extremities  GASTROINTESTINAL: obese, abdomen is soft, nontender, nondistended, normoactive bowel sounds, no palpable masses  INTEGUMENT: good skin turgor, appropriate for ethnicity, appears well perfused, no jaundice noted  MUSCULOSKELETAL: no clubbing or cyanosis, no obvious deformity  NEUROLOGIC: awake, alert, oriented x3, good muscle tone in 4 extremities, no obvious sensory deficits  PSYCHIATRIC: mood is good, affect is congruent with mood, linear and logical thought process    T(C): 36.9 (22 @ 14:26), Max: 36.9 (22 @ 14:26)  HR: 93 (22 @ 14:26) (88 - 99)  BP: 120/77 (22 @ 14:26) (111/72 - 125/69)  RR: 18 (22 @ 14:26) (14 - 23)  SpO2: 96% (22 @ 14:26) (96% - 100%)  Wt(kg): --    ----  INPATIENT MEDICATIONS  acetaminophen   IVPB .. 1000 milliGRAM(s) IV Intermittent every 6 hours  HYDROmorphone  Injectable 0.5 milliGRAM(s) IV Push every 4 hours PRN  HYDROmorphone  Injectable 0.5 milliGRAM(s) IV Push every 10 minutes PRN  hyoscyamine SL 0.125 milliGRAM(s) SubLingual every 6 hours PRN  ibuprofen IVPB .. 800 milliGRAM(s) IV Intermittent every 6 hours PRN  lactated ringers. 1000 milliLiter(s) IV Continuous <Continuous>  lactated ringers. 2000 milliLiter(s) IV Continuous <Continuous>  lactated ringers. 1000 milliLiter(s) IV Continuous <Continuous>  ondansetron Injectable 4 milliGRAM(s) IV Push once PRN  ondansetron Injectable 4 milliGRAM(s) IV Push every 6 hours  pantoprazole  Injectable 40 milliGRAM(s) IV Push daily      ----  I&O's Summary    2022 07:01  -  2022 14:40  --------------------------------------------------------  IN: 1400 mL / OUT: 520 mL / NET: 880 mL        LABS:              CAPILLARY BLOOD GLUCOSE                    ----  Personally reviewed:  Vital sign trends: [ x ] yes    [  ] no     [  ] n/a  Laboratory results: [x  ] yes    [  ] no     [  ] n/a

## 2022-06-22 NOTE — DISCHARGE NOTE PROVIDER - NSDCFUADDINST_GEN_ALL_CORE_FT
Increase ambulation and utilize incentive spirometry frequently.   Apply ice packs to abdominal wall/incision sites for 20 mins on/20 mins off every 4 hours for the next 24 hours and to shoulders as needed for discomfort.   Continue Bariatric Phase 1 Diet and follow nutritional guidelines as instructed.  Pain medications as needed. If taking narcotic pain medications, may take Colace/OTC stool softener (whole) as directed to prevent constipation.  Powdered medications can be mixed in low-fat yogurt or pudding, capsules can be opened and mixed with low-fat yogurt or pudding and swallowed (not chewed) or OR crush allowed medications and put in low-fat yogurt or pudding.  Wear binder for comfort and support.  Avoid heavy lifting in excess of 20-25 pounds and strenuous activities for duration of 4-6 weeks.  Call office with any questions or concerns.

## 2022-06-22 NOTE — DISCHARGE NOTE PROVIDER - NSDCMRMEDTOKEN_GEN_ALL_CORE_FT
acetaminophen 500 mg/15 mL oral liquid: 30 milliliter(s) orally every 8 hours, for a minimum of 3 days and a maximum of 5 days  Junel 1/20 oral tablet: 1 tab(s) orally once a day  Lovenox 40 mg/0.4 mL injectable solution: 40 milligram(s) injectable once a day for 14 days  omeprazole 20 mg oral delayed release capsule: 1 cap(s) orally once a day, open capsule and sprinkle on spoon of yogurt or pudding  ondansetron 4 mg oral tablet, disintegratin tab(s) orally 3 times a day, As Needed - for nausea  oxyCODONE 5 mg oral tablet: 1 tab(s) orally every 6 hours, As Needed - for severe pain

## 2022-06-22 NOTE — DISCHARGE NOTE PROVIDER - CARE PROVIDER_API CALL
Sunita French (MD)  Surgery  [address] [place] [address]  Phone: [phone]  Fax: [phone]  Follow Up Time:

## 2022-06-22 NOTE — DISCHARGE NOTE PROVIDER - NSDCFUSCHEDAPPT_GEN_ALL_CORE_FT
Sunita French  [facility] Physician [facility]  BARIATRIC [address]  Scheduled Appointment: 06/28/2022    Sunita French  [facility]  BARIATRIC [address]  Scheduled Appointment: 07/26/2022    [facility]  WEIGHTMGMT [address]  Scheduled Appointment: 08/23/2022

## 2022-06-22 NOTE — PATIENT PROFILE ADULT - FUNCTIONAL ASSESSMENT - DAILY ACTIVITY 3.

## 2022-06-22 NOTE — PATIENT PROFILE ADULT - NSPRESCRALCFREQ_GEN_A_NUR
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 His rash is consistent with Pityriasis Roscea. This is a post viral rash that will improve spontaneously with time. No treatment is needed. If you have questions or concerns, please feel free to contact me.         Sincerely,    Rosamaria Harris PA-C  [facility], [place], [place]  [address] [place] [id number] [phone]        Document electronically generated by:  Rosamaria Harris PA-C
Monthly or less

## 2022-06-22 NOTE — PATIENT PROFILE ADULT - FALL HARM RISK - UNIVERSAL INTERVENTIONS
Bed in lowest position, wheels locked, appropriate side rails in place/Call bell, personal items and telephone in reach/Instruct patient to call for assistance before getting out of bed or chair/Non-slip footwear when patient is out of bed/[place] to call system/Physically safe environment - no spills, clutter or unnecessary equipment/Purposeful Proactive Rounding/Room/bathroom lighting operational, light cord in reach

## 2022-06-22 NOTE — BRIEF OPERATIVE NOTE - NSICDXBRIEFPROCEDURE_GEN_ALL_CORE_FT
PROCEDURES:  Laparoscopic sleeve gastrectomy with laparoscopic repair of hiatal hernia 22-Jun-2022 10:58:26 with intra-op EGD Georgia Pruett

## 2022-06-23 ENCOUNTER — TRANSCRIPTION ENCOUNTER (OUTPATIENT)
Age: 27
End: 2022-06-23

## 2022-06-23 VITALS
RESPIRATION RATE: 18 BRPM | SYSTOLIC BLOOD PRESSURE: 116 MMHG | TEMPERATURE: 98 F | HEART RATE: 81 BPM | DIASTOLIC BLOOD PRESSURE: 79 MMHG | OXYGEN SATURATION: 99 %

## 2022-06-23 LAB
ANION GAP SERPL CALC-SCNC: 10 MMOL/L — SIGNIFICANT CHANGE UP (ref 5–17)
BUN SERPL-MCNC: 6 MG/DL — LOW (ref 7–23)
CALCIUM SERPL-MCNC: 9.2 MG/DL — SIGNIFICANT CHANGE UP (ref 8.4–10.5)
CHLORIDE SERPL-SCNC: 104 MMOL/L — SIGNIFICANT CHANGE UP (ref 96–108)
CO2 SERPL-SCNC: 23 MMOL/L — SIGNIFICANT CHANGE UP (ref 22–31)
CREAT SERPL-MCNC: 0.58 MG/DL — SIGNIFICANT CHANGE UP (ref 0.5–1.3)
EGFR: 128 ML/MIN/1.73M2 — SIGNIFICANT CHANGE UP
GLUCOSE SERPL-MCNC: 102 MG/DL — HIGH (ref 70–99)
HCT VFR BLD CALC: 38.1 % — SIGNIFICANT CHANGE UP (ref 34.5–45)
HGB BLD-MCNC: 12.7 G/DL — SIGNIFICANT CHANGE UP (ref 11.5–15.5)
MCHC RBC-ENTMCNC: 29.2 PG — SIGNIFICANT CHANGE UP (ref 27–34)
MCHC RBC-ENTMCNC: 33.3 GM/DL — SIGNIFICANT CHANGE UP (ref 32–36)
MCV RBC AUTO: 87.6 FL — SIGNIFICANT CHANGE UP (ref 80–100)
NRBC # BLD: 0 /100 WBCS — SIGNIFICANT CHANGE UP (ref 0–0)
PLATELET # BLD AUTO: 243 K/UL — SIGNIFICANT CHANGE UP (ref 150–400)
POTASSIUM SERPL-MCNC: 4 MMOL/L — SIGNIFICANT CHANGE UP (ref 3.5–5.3)
POTASSIUM SERPL-SCNC: 4 MMOL/L — SIGNIFICANT CHANGE UP (ref 3.5–5.3)
RBC # BLD: 4.35 M/UL — SIGNIFICANT CHANGE UP (ref 3.8–5.2)
RBC # FLD: 11.8 % — SIGNIFICANT CHANGE UP (ref 10.3–14.5)
SODIUM SERPL-SCNC: 137 MMOL/L — SIGNIFICANT CHANGE UP (ref 135–145)
WBC # BLD: 11.08 K/UL — HIGH (ref 3.8–10.5)
WBC # FLD AUTO: 11.08 K/UL — HIGH (ref 3.8–10.5)

## 2022-06-23 PROCEDURE — 36415 COLL VENOUS BLD VENIPUNCTURE: CPT

## 2022-06-23 PROCEDURE — C1889: CPT

## 2022-06-23 PROCEDURE — 85027 COMPLETE CBC AUTOMATED: CPT

## 2022-06-23 PROCEDURE — 88307 TISSUE EXAM BY PATHOLOGIST: CPT

## 2022-06-23 PROCEDURE — 80048 BASIC METABOLIC PNL TOTAL CA: CPT

## 2022-06-23 PROCEDURE — 99238 HOSP IP/OBS DSCHRG MGMT 30/<: CPT

## 2022-06-23 PROCEDURE — 81025 URINE PREGNANCY TEST: CPT

## 2022-06-23 RX ORDER — SIMETHICONE 80 MG/1
80 TABLET, CHEWABLE ORAL EVERY 8 HOURS
Refills: 0 | Status: DISCONTINUED | OUTPATIENT
Start: 2022-06-23 | End: 2022-06-23

## 2022-06-23 RX ORDER — OXYCODONE HYDROCHLORIDE 5 MG/1
5 TABLET ORAL EVERY 6 HOURS
Refills: 0 | Status: DISCONTINUED | OUTPATIENT
Start: 2022-06-23 | End: 2022-06-23

## 2022-06-23 RX ADMIN — Medication 400 MILLIGRAM(S): at 06:50

## 2022-06-23 RX ADMIN — PANTOPRAZOLE SODIUM 40 MILLIGRAM(S): 20 TABLET, DELAYED RELEASE ORAL at 12:24

## 2022-06-23 RX ADMIN — ONDANSETRON 4 MILLIGRAM(S): 8 TABLET, FILM COATED ORAL at 05:14

## 2022-06-23 RX ADMIN — SIMETHICONE 80 MILLIGRAM(S): 80 TABLET, CHEWABLE ORAL at 09:30

## 2022-06-23 RX ADMIN — OXYCODONE HYDROCHLORIDE 5 MILLIGRAM(S): 5 TABLET ORAL at 12:38

## 2022-06-23 RX ADMIN — Medication 400 MILLIGRAM(S): at 10:24

## 2022-06-23 RX ADMIN — ONDANSETRON 4 MILLIGRAM(S): 8 TABLET, FILM COATED ORAL at 12:25

## 2022-06-23 RX ADMIN — Medication 1000 MILLIGRAM(S): at 03:39

## 2022-06-23 RX ADMIN — Medication 800 MILLIGRAM(S): at 07:20

## 2022-06-23 RX ADMIN — ONDANSETRON 4 MILLIGRAM(S): 8 TABLET, FILM COATED ORAL at 16:20

## 2022-06-23 RX ADMIN — Medication 800 MILLIGRAM(S): at 14:30

## 2022-06-23 RX ADMIN — Medication 400 MILLIGRAM(S): at 03:13

## 2022-06-23 RX ADMIN — Medication 1000 MILLIGRAM(S): at 10:54

## 2022-06-23 RX ADMIN — OXYCODONE HYDROCHLORIDE 5 MILLIGRAM(S): 5 TABLET ORAL at 12:08

## 2022-06-23 RX ADMIN — Medication 400 MILLIGRAM(S): at 16:14

## 2022-06-23 RX ADMIN — ENOXAPARIN SODIUM 40 MILLIGRAM(S): 100 INJECTION SUBCUTANEOUS at 05:14

## 2022-06-23 RX ADMIN — ENOXAPARIN SODIUM 40 MILLIGRAM(S): 100 INJECTION SUBCUTANEOUS at 16:19

## 2022-06-23 RX ADMIN — SODIUM CHLORIDE 150 MILLILITER(S): 9 INJECTION, SOLUTION INTRAVENOUS at 05:15

## 2022-06-23 RX ADMIN — ONDANSETRON 4 MILLIGRAM(S): 8 TABLET, FILM COATED ORAL at 00:06

## 2022-06-23 RX ADMIN — Medication 1000 MILLIGRAM(S): at 16:44

## 2022-06-23 RX ADMIN — Medication 400 MILLIGRAM(S): at 13:50

## 2022-06-23 NOTE — DISCHARGE NOTE NURSING/CASE MANAGEMENT/SOCIAL WORK - PATIENT PORTAL LINK FT
You can access the FollowMyHealth Patient Portal offered by [facility] by registering at the following website: [email or web address]. By joining [employer]’s FollowMyHealth portal, you will also be able to view your health information using other applications (apps) compatible with our system.

## 2022-06-23 NOTE — PROGRESS NOTE ADULT - SUBJECTIVE AND OBJECTIVE BOX
HPI:   27 year old Female POD #1  s/p Laparoscopic sleeve gastrectomy with laparoscopic repair of hiatal hernia     Pt seen and examined at the bedside.  Patient is ambulating on the floor and utilizing incentive spirometry. She is tolerating clear liquid diet and urinating well. Minimal incisional discomfort. Denies any nausea or vomiting, c/o some gas discomfort    VITALS:  Vital Signs Last 24 Hrs  T(C): 36.9 (23 Jun 2022 07:41), Max: 37.1 (22 Jun 2022 23:30)  T(F): 98.5 (23 Jun 2022 07:41), Max: 98.7 (22 Jun 2022 23:30)  HR: 83 (23 Jun 2022 07:41) (82 - 99)  BP: 120/82 (23 Jun 2022 07:41) (104/70 - 125/69)  BP(mean): --  RR: 18 (23 Jun 2022 07:41) (14 - 23)  SpO2: 98% (23 Jun 2022 07:41) (96% - 100%)    06-22 @ 07:01  -  06-23 @ 07:00  --------------------------------------------------------  IN: 3150 mL / OUT: 2570 mL / NET: 580 mL    06-23 @ 07:01  -  06-23 @ 09:48  --------------------------------------------------------  IN: 60 mL / OUT: 0 mL / NET: 60 mL        LABS:                        12.7   11.08 )-----------( 243      ( 23 Jun 2022 06:00 )             38.1     06-23    137  |  104  |  6<L>  ----------------------------<  102<H>  4.0   |  23  |  0.58    Ca    9.2      23 Jun 2022 06:00        Physical Exam:  General: A/O x 3, NAD, resting comfortably in bed  Abdominal: soft, ND, appropriate incisional tenderness  Incisions: incisions clean, dry and intact  Extremities: no edema, no calf tenderness B/L        A/P:   27 year old Female POD #1  s/p Laparoscopic sleeve gastrectomy with laparoscopic repair of hiatal hernia    Simethicone ordered  Cont GI/DVT prophylaxis.   Cont  OOB/ambulation on floor / incentive spirometry.  Cont bariatric clear diet as tolerated - monitor oral intake  Dietician consult.   Discussed and reviewed home meds  and pain medication with patient . Discussed medication that requires crushing.  Plan to begin protein shakes at home.  Possibly discharged later today or tomorrow.  Case and plan D/W Dr. French      
Patient is a 27y old  Female who presents with a chief complaint of laparoscopic sleeve gastrectomy with hiatal hernia repair (23 Jun 2022 09:47)      INTERVAL HPI/OVERNIGHT EVENTS: Patient seen and examined at bedside. No overnight events    MEDICATIONS  (STANDING):  enoxaparin Injectable 40 milliGRAM(s) SubCutaneous every 12 hours  lactated ringers. 2000 milliLiter(s) (1000 mL/Hr) IV Continuous <Continuous>  lactated ringers. 1000 milliLiter(s) (150 mL/Hr) IV Continuous <Continuous>  ondansetron Injectable 4 milliGRAM(s) IV Push every 6 hours  pantoprazole  Injectable 40 milliGRAM(s) IV Push daily    MEDICATIONS  (PRN):  acetaminophen   IVPB .. 1000 milliGRAM(s) IV Intermittent every 6 hours PRN Mild Pain (1 - 3)  hyoscyamine SL 0.125 milliGRAM(s) SubLingual every 6 hours PRN Nausea and/or vomiting  ibuprofen IVPB .. 800 milliGRAM(s) IV Intermittent every 6 hours PRN Moderate Pain (4 - 6)  oxyCODONE    IR 5 milliGRAM(s) Oral every 6 hours PRN Severe Pain (7 - 10)  simethicone 80 milliGRAM(s) Chew every 8 hours PRN Gas      Allergies    No Known Allergies    Intolerances    Avelox (Anaphylaxis)  Levaquin (Anaphylaxis)      REVIEW OF SYSTEMS:  CONSTITUTIONAL: No fever or chills  HEENT:  No headache, no sore throat  RESPIRATORY: No cough, wheezing, or shortness of breath  CARDIOVASCULAR: No chest pain, palpitations, or leg swelling  GASTROINTESTINAL: No abd pain, nausea, vomiting, or diarrhea  GENITOURINARY: No dysuria, frequency, or hematuria  NEUROLOGICAL: no focal weakness or dizziness  MUSCULOSKELETAL: no myalgias     Vital Signs Last 24 Hrs  T(C): 36.9 (23 Jun 2022 07:41), Max: 37.1 (22 Jun 2022 23:30)  T(F): 98.5 (23 Jun 2022 07:41), Max: 98.7 (22 Jun 2022 23:30)  HR: 83 (23 Jun 2022 07:41) (82 - 99)  BP: 120/82 (23 Jun 2022 07:41) (104/70 - 122/82)  BP(mean): --  RR: 18 (23 Jun 2022 07:41) (17 - 18)  SpO2: 98% (23 Jun 2022 07:41) (96% - 100%)    PHYSICAL EXAM:  GENERAL: NAD  HEENT:  NCAT, moist mucous membranes  CHEST/LUNG:  CTA b/l, no rales, wheezes, or rhonchi  HEART:  RRR, S1, S2  ABDOMEN:  obese, BS+, soft, nontender, nondistended  EXTREMITIES: no edema, cyanosis, or calf tenderness  NERVOUS SYSTEM: AA&Ox3, sensation intact    LABS:                        12.7   11.08 )-----------( 243      ( 23 Jun 2022 06:00 )             38.1     CBC Full  -  ( 23 Jun 2022 06:00 )  WBC Count : 11.08 K/uL  Hemoglobin : 12.7 g/dL  Hematocrit : 38.1 %  Platelet Count - Automated : 243 K/uL  Mean Cell Volume : 87.6 fl  Mean Cell Hemoglobin : 29.2 pg  Mean Cell Hemoglobin Concentration : 33.3 gm/dL  Auto Neutrophil # : x  Auto Lymphocyte # : x  Auto Monocyte # : x  Auto Eosinophil # : x  Auto Basophil # : x  Auto Neutrophil % : x  Auto Lymphocyte % : x  Auto Monocyte % : x  Auto Eosinophil % : x  Auto Basophil % : x    23 Jun 2022 06:00    137    |  104    |  6      ----------------------------<  102    4.0     |  23     |  0.58     Ca    9.2        23 Jun 2022 06:00          CAPILLARY BLOOD GLUCOSE              RADIOLOGY & ADDITIONAL TESTS:    Consultant(s) Notes Reviewed:  [x] YES  [ ] NO    Care Discussed with [x] Consultants  [x] Patient  [ ] Family  [ ]      [ x] Other; RN  DVT ppx

## 2022-06-23 NOTE — PROGRESS NOTE ADULT - TIME BILLING
The total amount of time listed was spent reviewing the hospital notes, laboratory values, imaging findings, assessing/counseling the patient, discussing with consultant physicians, case management, social work, and nursing staff.
Agree with above.  Patient seen and examined.  Tolerating clears, good urine output, ambulating.  Probable discharge home later today.  Follow up in the office next week, call with any concerns.

## 2022-06-23 NOTE — DISCHARGE NOTE NURSING/CASE MANAGEMENT/SOCIAL WORK - NSDCPEFALRISK_GEN_ALL_CORE
For information on Fall & Injury Prevention, visit: [email or web address] OR  [email or web address] OR  https://www.cdc.gov/steadi/patient.html

## 2022-06-23 NOTE — DISCHARGE NOTE NURSING/CASE MANAGEMENT/SOCIAL WORK - NSDCFUADDAPPT_GEN_ALL_CORE_FT
Please call Dr. INDIA French's office ([phone]) to schedule a follow-up appointment to be seen in 1 week.    You have a scopolamine patch in place, remove scopolamine patch on 6/25/2022 and fold over on itself and discard.  Be sure not to touch the inside of the patch when removing it.

## 2022-06-24 ENCOUNTER — NON-APPOINTMENT (OUTPATIENT)
Age: 27
End: 2022-06-24

## 2022-06-28 ENCOUNTER — APPOINTMENT (OUTPATIENT)
Dept: BARIATRICS | Facility: CLINIC | Age: 27
End: 2022-06-28

## 2022-06-28 VITALS
HEIGHT: 63 IN | SYSTOLIC BLOOD PRESSURE: 120 MMHG | DIASTOLIC BLOOD PRESSURE: 76 MMHG | WEIGHT: 212.3 LBS | OXYGEN SATURATION: 99 % | HEART RATE: 84 BPM | TEMPERATURE: 96.5 F | BODY MASS INDEX: 37.62 KG/M2

## 2022-06-28 PROCEDURE — 99024 POSTOP FOLLOW-UP VISIT: CPT

## 2022-06-28 RX ORDER — OXYCODONE 5 MG/1
5 TABLET ORAL
Qty: 8 | Refills: 0 | Status: DISCONTINUED | COMMUNITY
Start: 2022-06-13 | End: 2022-06-28

## 2022-06-28 RX ORDER — BUSPIRONE HYDROCHLORIDE 7.5 MG/1
TABLET ORAL
Refills: 0 | Status: DISCONTINUED | COMMUNITY
End: 2022-06-28

## 2022-06-28 RX ORDER — FLUOXETINE HCL 10 MG
TABLET ORAL
Refills: 0 | Status: DISCONTINUED | COMMUNITY
End: 2022-06-28

## 2022-07-06 ENCOUNTER — NON-APPOINTMENT (OUTPATIENT)
Age: 27
End: 2022-07-06

## 2022-07-26 ENCOUNTER — APPOINTMENT (OUTPATIENT)
Dept: BARIATRICS | Facility: CLINIC | Age: 27
End: 2022-07-26

## 2022-07-26 VITALS — BODY MASS INDEX: 36.5 KG/M2 | WEIGHT: 206 LBS | HEIGHT: 63 IN

## 2022-07-26 PROCEDURE — 99024 POSTOP FOLLOW-UP VISIT: CPT

## 2022-07-26 RX ORDER — ONDANSETRON 4 MG/1
4 TABLET, ORALLY DISINTEGRATING ORAL EVERY 8 HOURS
Qty: 15 | Refills: 0 | Status: DISCONTINUED | COMMUNITY
Start: 2022-06-13 | End: 2022-07-26

## 2022-07-26 NOTE — REASON FOR VISIT
[Follow-Up Visit] : a follow-up visit for [Other Location: e.g. School (Enter Location, City,State)___] : at [unfilled], at the time of the visit. [Medical Office: ([facility])___] : at the medical office located in  [Patient] : the patient [Self] : self

## 2022-08-01 PROBLEM — E66.01 MORBID (SEVERE) OBESITY DUE TO EXCESS CALORIES: Chronic | Status: ACTIVE | Noted: 2022-06-06

## 2022-08-01 PROBLEM — D73.4 CYST OF SPLEEN: Chronic | Status: ACTIVE | Noted: 2022-06-06

## 2022-08-01 PROBLEM — K76.0 FATTY (CHANGE OF) LIVER, NOT ELSEWHERE CLASSIFIED: Chronic | Status: ACTIVE | Noted: 2022-06-06

## 2022-08-01 PROBLEM — K44.9 DIAPHRAGMATIC HERNIA WITHOUT OBSTRUCTION OR GANGRENE: Chronic | Status: ACTIVE | Noted: 2022-06-06

## 2022-08-01 PROBLEM — E78.5 HYPERLIPIDEMIA, UNSPECIFIED: Chronic | Status: ACTIVE | Noted: 2022-06-06

## 2022-08-01 PROBLEM — F41.9 ANXIETY DISORDER, UNSPECIFIED: Chronic | Status: ACTIVE | Noted: 2022-06-06

## 2022-08-01 PROBLEM — E28.2 POLYCYSTIC OVARIAN SYNDROME: Chronic | Status: ACTIVE | Noted: 2022-06-06

## 2022-08-01 PROBLEM — G43.909 MIGRAINE, UNSPECIFIED, NOT INTRACTABLE, WITHOUT STATUS MIGRAINOSUS: Chronic | Status: ACTIVE | Noted: 2022-06-06

## 2022-08-01 PROBLEM — U07.1 COVID-19: Chronic | Status: ACTIVE | Noted: 2022-06-06

## 2022-08-01 PROBLEM — K21.9 GASTRO-ESOPHAGEAL REFLUX DISEASE WITHOUT ESOPHAGITIS: Chronic | Status: ACTIVE | Noted: 2022-06-06

## 2022-08-01 PROBLEM — N83.209 UNSPECIFIED OVARIAN CYST, UNSPECIFIED SIDE: Chronic | Status: ACTIVE | Noted: 2022-06-06

## 2022-08-01 PROBLEM — J30.2 OTHER SEASONAL ALLERGIC RHINITIS: Chronic | Status: ACTIVE | Noted: 2022-06-06

## 2022-08-13 ENCOUNTER — NON-APPOINTMENT (OUTPATIENT)
Age: 27
End: 2022-08-13

## 2022-08-23 ENCOUNTER — APPOINTMENT (OUTPATIENT)
Dept: BARIATRICS/WEIGHT MGMT | Facility: CLINIC | Age: 27
End: 2022-08-23

## 2022-08-23 VITALS — WEIGHT: 195 LBS | HEIGHT: 63 IN | BODY MASS INDEX: 34.55 KG/M2

## 2022-08-23 PROCEDURE — 97803 MED NUTRITION INDIV SUBSEQ: CPT | Mod: 95

## 2022-09-29 ENCOUNTER — LABORATORY RESULT (OUTPATIENT)
Age: 27
End: 2022-09-29

## 2022-09-30 LAB
25(OH)D3 SERPL-MCNC: 47.7 NG/ML
ALBUMIN SERPL ELPH-MCNC: 4.5 G/DL
ALP BLD-CCNC: 67 U/L
ALT SERPL-CCNC: 24 U/L
ANION GAP SERPL CALC-SCNC: 12 MMOL/L
AST SERPL-CCNC: 21 U/L
BASOPHILS # BLD AUTO: 0.03 K/UL
BASOPHILS NFR BLD AUTO: 0.3 %
BILIRUB SERPL-MCNC: 0.2 MG/DL
BUN SERPL-MCNC: 10 MG/DL
CALCIUM SERPL-MCNC: 10.3 MG/DL
CHLORIDE SERPL-SCNC: 105 MMOL/L
CHOLEST SERPL-MCNC: 179 MG/DL
CO2 SERPL-SCNC: 24 MMOL/L
CREAT SERPL-MCNC: 0.76 MG/DL
EGFR: 111 ML/MIN/1.73M2
EOSINOPHIL # BLD AUTO: 0.11 K/UL
EOSINOPHIL NFR BLD AUTO: 1.3 %
ESTIMATED AVERAGE GLUCOSE: 114 MG/DL
FERRITIN SERPL-MCNC: 102 NG/ML
FOLATE SERPL-MCNC: 7.9 NG/ML
GLUCOSE SERPL-MCNC: 90 MG/DL
HBA1C MFR BLD HPLC: 5.6 %
HCT VFR BLD CALC: 41.7 %
HDLC SERPL-MCNC: 36 MG/DL
HGB BLD-MCNC: 13.7 G/DL
IMM GRANULOCYTES NFR BLD AUTO: 0.2 %
IRON SATN MFR SERPL: 10 %
IRON SERPL-MCNC: 43 UG/DL
LDLC SERPL CALC-MCNC: 102 MG/DL
LYMPHOCYTES # BLD AUTO: 3.59 K/UL
LYMPHOCYTES NFR BLD AUTO: 41.8 %
MAN DIFF?: NORMAL
MCHC RBC-ENTMCNC: 29.2 PG
MCHC RBC-ENTMCNC: 32.9 GM/DL
MCV RBC AUTO: 88.9 FL
MONOCYTES # BLD AUTO: 0.55 K/UL
MONOCYTES NFR BLD AUTO: 6.4 %
NEUTROPHILS # BLD AUTO: 4.29 K/UL
NEUTROPHILS NFR BLD AUTO: 50 %
NONHDLC SERPL-MCNC: 144 MG/DL
PLATELET # BLD AUTO: 251 K/UL
POTASSIUM SERPL-SCNC: 4.6 MMOL/L
PROT SERPL-MCNC: 7.5 G/DL
RBC # BLD: 4.69 M/UL
RBC # FLD: 12.7 %
SODIUM SERPL-SCNC: 141 MMOL/L
TIBC SERPL-MCNC: 432 UG/DL
TRIGL SERPL-MCNC: 207 MG/DL
TSH SERPL-ACNC: 1.14 UIU/ML
UIBC SERPL-MCNC: 388 UG/DL
VIT B12 SERPL-MCNC: 547 PG/ML
WBC # FLD AUTO: 8.59 K/UL

## 2022-10-04 ENCOUNTER — APPOINTMENT (OUTPATIENT)
Dept: BARIATRICS | Facility: CLINIC | Age: 27
End: 2022-10-04

## 2022-10-04 VITALS
BODY MASS INDEX: 34.02 KG/M2 | TEMPERATURE: 96.7 F | HEART RATE: 82 BPM | WEIGHT: 192.02 LBS | OXYGEN SATURATION: 98 % | DIASTOLIC BLOOD PRESSURE: 74 MMHG | HEIGHT: 63 IN | SYSTOLIC BLOOD PRESSURE: 120 MMHG

## 2022-10-04 DIAGNOSIS — Z92.29 PERSONAL HISTORY OF OTHER DRUG THERAPY: ICD-10-CM

## 2022-10-04 LAB — VIT A SERPL-MCNC: 44.7 UG/DL

## 2022-10-04 PROCEDURE — 99214 OFFICE O/P EST MOD 30 MIN: CPT

## 2022-10-04 RX ORDER — ENOXAPARIN SODIUM 40 MG/.4ML
40 INJECTION, SOLUTION SUBCUTANEOUS DAILY
Qty: 14 | Refills: 0 | Status: DISCONTINUED | COMMUNITY
Start: 2022-06-13 | End: 2022-10-04

## 2022-10-04 NOTE — HISTORY OF PRESENT ILLNESS
[Procedure: ___] : Procedure performed: [unfilled]  [Date of Surgery: ___] : Date of Surgery:   [unfilled] [Surgeon Name:   ___] : Surgeon Name: Dr. SOLIZ [de-identified] : 27 year old F is 1 week s/p Laparoscopic Sleeve Gastrectomy. Pt is recovering well, feels good. Patient has good protein intake taking half to one protein shake per day with clear liquids. Taking protonix. Urine color between transparent yellow. Tolerating zero calorie liquid well, taking 40oz/day. Reports BM once every day, taking miralax occasionally. Pt is tolerating walking approximately 10min at a time. Pt states occasionally has temp 99.5, as having Herpes outbreak since surgery - taking valtrex. No abdominal pain, reflux, nausea, vomiting, constipation or diarrhea. No calf tenderness, chest pain, palpitations, or shortness of breath.\par \par Pt states have heavy menses since surgery, patient is taking lovenox.  [Pre-Op Weight ___] : Pre-op weight was [unfilled] lbs

## 2022-10-04 NOTE — ASSESSMENT
[FreeTextEntry1] : 27 year old F is 1 month s/p Laparoscopic Sleeve Gastrectomy. Pt is recovering well, feels good. Weight lost since last visit.\par Doing well overall.\par \par Continue pureed/soft low fat, low carbohydrate and high protein diet until 6 weeks postop, then progress to regular foods as tolerated\par Daily zero calorie liquid intake 64 oz per day\par Increase ambulation with goal of 8-10k steps/day, may start strength exercises 6 weeks post op\par Continue Bariatric Fusion vitamins as directed\par \par Labs ordered to be done before next visit \par Return to the office in 2 months\par Follow up with Nutritionist Agatha Han as scheduled\par Nutrition class today\par All questions answered\par \par \par Additional time spent before and after visit reviewing chart

## 2022-10-04 NOTE — ASSESSMENT
[FreeTextEntry1] : 27 year old F is 1 week s/p Laparoscopic Sleeve Gastrectomy. Pt is recovering well, feels good.\par Doing well.\par \par Nutrition and exercise guidelines reviewed\par Gave pt pre-printed post nutrition guidelines for suggestions of full liquid diet for one more week\par Daily zero calorie liquid intake 64 oz/day\par Protein focused diet and start vitamins tomorrow\par Advance diet as recommended - soft foods after 2 wks postop\par Increase activities and track steps - goal 8-10k steps/day\par May return to work next week and can drive\par Lifting restrictions for 5 more weeks - nothing greater than 5 lbs\par Followup with Nutritionist and nutrition classes\par \par Return to office in 1 month\par All questions answered \par \par Pt seen with Dr. French\par \par Additional time spent before and after visit reviewing chart

## 2022-10-04 NOTE — HISTORY OF PRESENT ILLNESS
[Procedure: ___] : Procedure performed: [unfilled]  [Date of Surgery: ___] : Date of Surgery:   [unfilled] [Surgeon Name:   ___] : Surgeon Name: Dr. SOLIZ [de-identified] : 27 year old F is over 3 months s/p Laparoscopic Sleeve Gastrectomy. Feels good. Weight loss since last visit. Consuming adequate protein intake with 3 meals/day, taking 15-20 min to consume a meal. Reports no reflux but continues to take protonix. Trying to drink adequate zero calorie liquid, 20oz/day. Reports constipation occasionally, taking colace as needed. Taking bariatric vitamins daily. Sleeping ~ 8 hours/night. Ambulating daily for exercise, 8k steps/day and will start going to the gym. No abdominal pain, reflux, nausea, or vomiting. [Pre-Op Weight ___] : Pre-op weight was [unfilled] lbs

## 2022-10-04 NOTE — PHYSICAL EXAM
[Normal] : affect appropriate [de-identified] : soft, NT, ND, no diastasis or hernias appreciated, incisions well healed, retained suture to LUQ incision removed without difficulty.

## 2022-10-04 NOTE — PHYSICAL EXAM
[Normal] : affect appropriate [de-identified] : soft, NT, ND, normoactive bowel sounds, no hepatosplenomegaly or masses or diastasis appreciated\par incisions clean/dry, healing well with wound edges well approximated, no drainage or bleeding

## 2022-10-04 NOTE — HISTORY OF PRESENT ILLNESS
[de-identified] : 27 year old F is 1 month s/p Laparoscopic Sleeve Gastrectomy. Pt is recovering well, feels good. Weight lost since last visit. Tolerating soft foods with adequate protein intake, taking 30min to consume a meal. Pt had 2 episodes of choking on saliva while sleeping 2 wks ago, still taking protonix. Drinking adequate zero calorie liquid  40-64 oz/day. Reports constipation improved since taking daily colace. Taking vitamin supplements daily. Sleeping ~ 8 hours/night. Ambulating daily for exercise No abdominal pain, reflux, nausea, or vomiting.\par \par Pt adds had concern of feeling tired even prior to surgery. Pt's last labs Dec 2021 had normal vitamin B12 level. [Pre-Op Weight ___] : Pre-op weight was [unfilled] lbs

## 2022-10-04 NOTE — ASSESSMENT
[FreeTextEntry1] : 27 year old F is over 3 months s/p Laparoscopic Sleeve Gastrectomy. Feels good. Weight loss since last visit.\par Doing well overall.\par \par Reviewed nutrition and exercise guidelines\par Protein focus diet with 3 meals/day\par Maintain food log\par Increase daily zero calorie liquid intake 64 oz per day\par Increase ambulation with goal of 8-10k steps/day, recommend to incorporate strength training \par Continue Bariatric Fusion cap with iron plus calcium\par Pt to call to confirm dosage of protonix - will trial protonix 20mg QD\par \par Lab performed 9/29/2022, results reviewed with pt\par Elevated lipid panel - pt to followup with PCP\par Otherwise no other significant abnormalities \par \par Return to the office in 3 months\par Follow up with Nutritionist Agatha Han\par Nutrition class today\par All questions answered\par \par \par Additional time spent before and after visit reviewing chart

## 2022-10-05 LAB — VIT B1 SERPL-MCNC: 143.4 NMOL/L

## 2022-11-03 RX ORDER — PANTOPRAZOLE 40 MG/1
40 TABLET, DELAYED RELEASE ORAL DAILY
Refills: 0 | Status: DISCONTINUED | COMMUNITY
Start: 2022-04-15 | End: 2022-11-03

## 2022-12-15 ENCOUNTER — APPOINTMENT (OUTPATIENT)
Dept: BARIATRICS/WEIGHT MGMT | Facility: CLINIC | Age: 27
End: 2022-12-15

## 2023-01-03 ENCOUNTER — APPOINTMENT (OUTPATIENT)
Dept: BARIATRICS | Facility: CLINIC | Age: 28
End: 2023-01-03
Payer: COMMERCIAL

## 2023-01-03 ENCOUNTER — NON-APPOINTMENT (OUTPATIENT)
Age: 28
End: 2023-01-03

## 2023-01-03 VITALS
TEMPERATURE: 96.7 F | BODY MASS INDEX: 32.38 KG/M2 | HEART RATE: 87 BPM | WEIGHT: 182.76 LBS | HEIGHT: 63 IN | DIASTOLIC BLOOD PRESSURE: 73 MMHG | OXYGEN SATURATION: 98 % | SYSTOLIC BLOOD PRESSURE: 120 MMHG

## 2023-01-03 DIAGNOSIS — R63.4 ABNORMAL WEIGHT LOSS: ICD-10-CM

## 2023-01-03 PROCEDURE — 99401 PREV MED CNSL INDIV APPRX 15: CPT | Mod: 25

## 2023-01-03 PROCEDURE — 99215 OFFICE O/P EST HI 40 MIN: CPT

## 2023-01-03 RX ORDER — ATORVASTATIN CALCIUM 80 MG/1
TABLET, FILM COATED ORAL DAILY
Refills: 0 | Status: DISCONTINUED | COMMUNITY
End: 2023-01-03

## 2023-01-03 RX ORDER — NORETHINDRONE ACETATE AND ETHINYL ESTRADIOL AND FERROUS FUMARATE 1MG-20(24)
KIT ORAL DAILY
Refills: 0 | Status: ACTIVE | COMMUNITY

## 2023-01-03 RX ORDER — MULTIVITAMIN
TABLET ORAL DAILY
Refills: 0 | Status: ACTIVE | COMMUNITY

## 2023-01-05 NOTE — PHYSICAL EXAM
[Normal] : affect appropriate [de-identified] : soft, NT, ND, no diastasis or hernias appreciated, incisions well healed

## 2023-01-05 NOTE — ASSESSMENT
[FreeTextEntry1] : 27 year old F is over 6 months s/p Laparoscopic Sleeve Gastrectomy. Feels good. Weight loss since last visit. Pt adds would like to see outside nutritionist in-office closer to home ([place] [employer]).\par Doing well overall.\par \par Greater than 15 minutes spent with pt discussing importance of health maintenance principles including dietary and lifestyle changes as well as long-term weight maintenance\par Reviewed guidelines about nutrition and snacking - protein focus diet with 3 meals/day\par Encouraged better food choices - maintain food log\par Reviewed importance of daily Bariatric MVI - advised pt can try soft chews, vitamin info list given to pt\par Increase daily zero calorie liquid intake goal of 64 oz/day\par Increase daily exercise regimen incorporating cardio and strength training\par Track steps - goal approximately 8-10k steps per day\par Continue protonix\par \par Labs ordered to be done before next visit\par Return to office in 3 months\par Follow up with nutritionist\par All questions answered\par \par \par Additional time spent before and after visit reviewing chart

## 2023-01-05 NOTE — HISTORY OF PRESENT ILLNESS
[Procedure: ___] : Procedure performed: [unfilled]  [Date of Surgery: ___] : Date of Surgery:   [unfilled] [Surgeon Name:   ___] : Surgeon Name: Dr. SOLIZ [Pre-Op Weight ___] : Pre-op weight was [unfilled] lbs [de-identified] : 27 year old F is over 6 months s/p Laparoscopic Sleeve Gastrectomy. Feels good. Weight loss since last visit. Consuming 3 meals/day with protein intake, taking 15 min to consume a meal. Reports no reflux as long as taking protonix. Trying to drink adequate zero calorie liquid, 50oz/day. Reports constipation occasionally, taking colace as needed. Taking over the counter vitamins gummies daily - unable to tolerate even bariatric caps. Sleeping ~ 8 hours/night. Ambulating daily for exercise, plus goes to the gym 3x/wk. No abdominal pain, reflux, nausea, or vomiting. Pt adds would like to see outside nutritionist in-office closer to home ([place]).

## 2023-01-24 ENCOUNTER — NON-APPOINTMENT (OUTPATIENT)
Age: 28
End: 2023-01-24

## 2023-01-24 RX ORDER — PANTOPRAZOLE 20 MG/1
20 TABLET, DELAYED RELEASE ORAL
Qty: 30 | Refills: 3 | Status: DISCONTINUED | COMMUNITY
Start: 2022-11-07 | End: 2023-01-24

## 2023-01-25 RX ORDER — PANTOPRAZOLE 40 MG/1
40 TABLET, DELAYED RELEASE ORAL
Qty: 90 | Refills: 3 | Status: DISCONTINUED | COMMUNITY
Start: 2023-01-24 | End: 2023-01-25

## 2023-03-27 ENCOUNTER — APPOINTMENT (OUTPATIENT)
Dept: BARIATRICS | Facility: CLINIC | Age: 28
End: 2023-03-27
Payer: COMMERCIAL

## 2023-03-27 VITALS — BODY MASS INDEX: 31.89 KG/M2 | WEIGHT: 180 LBS | HEIGHT: 63 IN

## 2023-03-27 PROCEDURE — 99214 OFFICE O/P EST MOD 30 MIN: CPT | Mod: 95

## 2023-03-27 NOTE — REASON FOR VISIT
[Home] : at home, [unfilled] , at the time of the visit. [Medical Office: ([facility])___] : at the medical office located in  [Follow-Up Visit] : a follow-up visit for [S/P Bariatric Surgery] : s/p bariatric surgery

## 2023-03-28 LAB
25(OH)D3 SERPL-MCNC: 36.5 NG/ML
ALBUMIN SERPL ELPH-MCNC: 4.5 G/DL
ALP BLD-CCNC: 73 U/L
ALT SERPL-CCNC: 13 U/L
ANION GAP SERPL CALC-SCNC: 17 MMOL/L
AST SERPL-CCNC: 13 U/L
BASOPHILS # BLD AUTO: 0.03 K/UL
BASOPHILS NFR BLD AUTO: 0.4 %
BILIRUB SERPL-MCNC: 0.2 MG/DL
BUN SERPL-MCNC: 10 MG/DL
CALCIUM SERPL-MCNC: 10.2 MG/DL
CHLORIDE SERPL-SCNC: 105 MMOL/L
CHOLEST SERPL-MCNC: 190 MG/DL
CO2 SERPL-SCNC: 20 MMOL/L
CREAT SERPL-MCNC: 0.72 MG/DL
EGFR: 117 ML/MIN/1.73M2
EOSINOPHIL # BLD AUTO: 0.13 K/UL
EOSINOPHIL NFR BLD AUTO: 1.9 %
ESTIMATED AVERAGE GLUCOSE: 114 MG/DL
FOLATE SERPL-MCNC: 14.8 NG/ML
GLUCOSE SERPL-MCNC: 100 MG/DL
HBA1C MFR BLD HPLC: 5.6 %
HCT VFR BLD CALC: 42.8 %
HDLC SERPL-MCNC: 43 MG/DL
HGB BLD-MCNC: 14.2 G/DL
IMM GRANULOCYTES NFR BLD AUTO: 0.3 %
IRON SERPL-MCNC: 83 UG/DL
LDLC SERPL CALC-MCNC: 110 MG/DL
LYMPHOCYTES # BLD AUTO: 2.66 K/UL
LYMPHOCYTES NFR BLD AUTO: 38.8 %
MAN DIFF?: NORMAL
MCHC RBC-ENTMCNC: 29.3 PG
MCHC RBC-ENTMCNC: 33.2 GM/DL
MCV RBC AUTO: 88.4 FL
MONOCYTES # BLD AUTO: 0.36 K/UL
MONOCYTES NFR BLD AUTO: 5.2 %
NEUTROPHILS # BLD AUTO: 3.66 K/UL
NEUTROPHILS NFR BLD AUTO: 53.4 %
NONHDLC SERPL-MCNC: 147 MG/DL
PLATELET # BLD AUTO: 244 K/UL
POTASSIUM SERPL-SCNC: 4.7 MMOL/L
PROT SERPL-MCNC: 7.6 G/DL
RBC # BLD: 4.84 M/UL
RBC # FLD: 12.7 %
SODIUM SERPL-SCNC: 142 MMOL/L
TRIGL SERPL-MCNC: 184 MG/DL
TSH SERPL-ACNC: 0.99 UIU/ML
VIT B12 SERPL-MCNC: 390 PG/ML
WBC # FLD AUTO: 6.86 K/UL

## 2023-03-29 LAB — ZINC SERPL-MCNC: 87 UG/DL

## 2023-04-04 LAB
VIT A SERPL-MCNC: 58.7 UG/DL
VIT B1 SERPL-MCNC: 119.9 NMOL/L

## 2023-04-06 NOTE — HISTORY OF PRESENT ILLNESS
[Procedure: ___] : Procedure performed: [unfilled]  [Date of Surgery: ___] : Date of Surgery:   [unfilled] [Surgeon Name:   ___] : Surgeon Name: Dr. SOLIZ [Pre-Op Weight ___] : Pre-op weight was [unfilled] lbs [de-identified] : 28 year old F is 9 months s/p Laparoscopic Sleeve Gastrectomy. Pt feels she has no direction with meal planning and asking for help with menu suggestions. No weight loss since last visit. Consuming 3 meals/day with protein intake, taking 15 min to consume a meal. Snacking on ice cream after dinner. Reports no reflux as long as taking Pantoprazole. Trying to drink adequate zero calorie liquid, 50 Oz/day along with 1 cup of coffee.. Reports constipation occasionally, taking Colace as needed. Taking over the counter vitamins gummies daily - unable to tolerate  bariatric caps. Sleeping ~ 8 hours/night. Unable to exercise due to lack of motivation and time constraints.. No abdominal pain, reflux, nausea, or vomiting.

## 2023-04-06 NOTE — PHYSICAL EXAM
[Normal] : affect appropriate [de-identified] : soft, NT, ND, no diastasis or hernias appreciated, incisions well healed

## 2023-04-06 NOTE — ASSESSMENT
[FreeTextEntry1] : 28 year old F is 9 months s/p Laparoscopic Sleeve Gastrectomy.  No weight loss since last visit.  Reports no reflux as long as taking Pantoprazole. Experiences constipation occasionally, taking Colace as needed. . Unable to exercise due to lack of motivation and time constraints. No abdominal pain, reflux, nausea, or vomiting. \par \par Labs performed on 3/26/2023. Discussed results with patient.  H, HDL 43 L,  H No other significant abnormalities seen.\par \par Greater than 15 minutes spent with pt discussing importance of health maintenance principles including dietary and lifestyle changes as well as long-term weight maintenance. Discussed weekly menu plan in detail, offering suggestions for lunch and dinner. Discussed fluid options and strategies on how to avoid snacking after dinner.\par \par \par Increase zero-calorie liquids (64 oz/day)\par 3 protein-focused meals/day (aim for 60 g protein/day) and avoid bedtime snacking\par Start exercising with cardio (8 K- 10 K steps/day) and strength training 2 x -3 x/week\par Continue  vitamins daily\par Download "Epigami" kalpesh for food logging and accountability\par Follow-up in 3 months \par \par Call with any questions or concerns\par \par Additional time spent before and after visit reviewing chart. \par \par \par \par \par \par \par \par \par

## 2023-05-18 ENCOUNTER — NON-APPOINTMENT (OUTPATIENT)
Age: 28
End: 2023-05-18

## 2023-05-18 ENCOUNTER — APPOINTMENT (OUTPATIENT)
Dept: BARIATRICS | Facility: CLINIC | Age: 28
End: 2023-05-18
Payer: COMMERCIAL

## 2023-05-18 VITALS
HEIGHT: 63 IN | SYSTOLIC BLOOD PRESSURE: 120 MMHG | DIASTOLIC BLOOD PRESSURE: 72 MMHG | TEMPERATURE: 96.7 F | BODY MASS INDEX: 32.27 KG/M2 | HEART RATE: 91 BPM | OXYGEN SATURATION: 98 % | WEIGHT: 182.1 LBS

## 2023-05-18 DIAGNOSIS — Z80.8 FAMILY HISTORY OF MALIGNANT NEOPLASM OF OTHER ORGANS OR SYSTEMS: ICD-10-CM

## 2023-05-18 PROCEDURE — 99214 OFFICE O/P EST MOD 30 MIN: CPT

## 2023-05-22 NOTE — HISTORY OF PRESENT ILLNESS
[Procedure: ___] : Procedure performed: [unfilled]  [Date of Surgery: ___] : Date of Surgery:   [unfilled] [Surgeon Name:   ___] : Surgeon Name: Dr. SOLIZ [Pre-Op Weight ___] : Pre-op weight was [unfilled] lbs [de-identified] : 28 year old woman presents ~1 year s/p Laparoscopic Sleeve Gastrectomy. Patient doing well; weight stable since last visit; patient interested in weight loss medication. Reports no history of substance abuse and uncontrolled blood pressure; reports no family history of pancreatitis, MEN 2, diabetes, nor kidney stones; reports grandmother had thyroid cancer.  Reports previous success with phentermine; reports Ozempic did not yield much weight loss. Reports no abdominal pain, nausea/vomiting, constipation (occasionally takes stool softener; reports history of constipation since childhood), diarrhea, reflux/heartburn (takes Pantoprazole 40 mg/day). Patient eating 3 protein-rich meals/day, drinking adequate zero-calorie fluids/day, taking bariatric vitamins, and exercising with cardio and strength training. Patient reports simple cyst on spleen (revealed on 4/2022 US.

## 2023-05-22 NOTE — REVIEW OF SYSTEMS
[Patient Intake Form Reviewed] : Patient intake form was reviewed [Negative] : Allergic/Immunologic [Abdominal Pain] : no abdominal pain [Vomiting] : no vomiting [Constipation] : no constipation [Diarrhea] : no diarrhea [Reflux/Heartburn] : no reflux/ heartburn [Hernia] : no hernia [FreeTextEntry7] : abdominal bloating

## 2023-05-22 NOTE — PHYSICAL EXAM
[Normal] : affect appropriate [de-identified] : soft, NT, ND, no evidence of hernia or diastasis, incisions well-healed

## 2023-05-22 NOTE — ASSESSMENT
[FreeTextEntry1] : 28 year old woman presents ~1 year s/p Laparoscopic Sleeve Gastrectomy. Patient doing well. Nutriton and exercise guidelines reviewed with the patient. Recommended patient start phentermine. Discussed with patient to use 2 methods of birth control (patient is on Junel ) if taking phentermine and topiramate combo\par \par Labs performed on 3/26/ 2023. \par \par Continue 3 protein-focused meals/day (aim for 60 g - 70 g protein/day)\par Encourage zero calorie fluid intake (64 oz/day)\par Continue bariatric vitamins \par Take  probiotics for gut health\par Exercise with cardio (aim for  8k-10k steps/day), and strength training 2 x/week\par Start phentermine 18.75 mg with breakfast and topiramate 25 mg at bedtime daily, pending insurance approval. \par Follow-up with nutritionist \par Follow-up in 1 month\par All questions answered\par \par Call with any questions or concerns\par \par Time before and after visit spent reviewing chart

## 2023-06-02 ENCOUNTER — NON-APPOINTMENT (OUTPATIENT)
Age: 28
End: 2023-06-02

## 2023-06-15 ENCOUNTER — APPOINTMENT (OUTPATIENT)
Dept: BARIATRICS | Facility: CLINIC | Age: 28
End: 2023-06-15
Payer: COMMERCIAL

## 2023-06-15 VITALS — BODY MASS INDEX: 31.18 KG/M2 | HEIGHT: 63 IN | WEIGHT: 176 LBS

## 2023-06-15 PROCEDURE — 99214 OFFICE O/P EST MOD 30 MIN: CPT | Mod: 95

## 2023-06-15 NOTE — ASSESSMENT
[FreeTextEntry1] : 28 year old woman s/p Laparoscopic Sleeve Gastrectomy. Patient doing well. Nutriton and exercise guidelines reviewed with the patient. \par \par Increase Phentermine (37.5 mg daily with breakfast) and continue topiramate (25 mg at bedtime)\par Continue 3 protein-focused meals/day (aim for 60 g - 70 g protein/day)\par Encourage zero calorie fluid intake (64 oz/day)\par Continue bariatric vitamins \par Exercise with cardio (aim for 8 k- 10 k steps/day), and strength training 2 x/week\par Use step counter\par Weigh yourself 1 x -2 x/week\par Follow-up with nutritionist \par Follow-up in 1 month\par All questions answered\par \par Call with any questions or concerns\par \par Time before and after visit spent reviewing chart\par

## 2023-06-15 NOTE — HISTORY OF PRESENT ILLNESS
[Procedure: ___] : Procedure performed: [unfilled]  [Date of Surgery: ___] : Date of Surgery:   [unfilled] [Surgeon Name:   ___] : Surgeon Name: Dr. SOLIZ [Pre-Op Weight ___] : Pre-op weight was [unfilled] lbs [de-identified] : 28 year old woman s/p Laparoscopic Sleeve Gastrectomy. Patient doing well. Reports no abdominal pain, nausea/vomiting, constipation, diarrhea; reports reflux/heartburn 2-3x/week (taking Pantoprazole; denies nocturnal reflux). Patient eating 3 protein-rich meals/day, trying to drink adequate zero-calorie fluids/day, taking bariatric vitamins, and walking for exercise. Pt taking phentermine and topiramate for weight loss ~1 month ago; lost 6 lbs since starting this regimen, and is tolerating medication well; reports increased thirst.  Patient is still on low-dose phentermine taking 1/2 pill/day.\par \par History of bariatric surgery: Laparoscopic sleeve gastrectomy\par Obesity comorbidities: None\par Comorbidities improved/resolved: None\par Anti obesity medication: Phentermine and topiramate\par Obesity medication side effects: Increased thirst\par  \par

## 2023-07-22 ENCOUNTER — NON-APPOINTMENT (OUTPATIENT)
Age: 28
End: 2023-07-22

## 2023-08-04 ENCOUNTER — APPOINTMENT (OUTPATIENT)
Dept: BARIATRICS | Facility: CLINIC | Age: 28
End: 2023-08-04
Payer: COMMERCIAL

## 2023-08-04 VITALS — BODY MASS INDEX: 30.83 KG/M2 | WEIGHT: 174 LBS | HEIGHT: 63 IN

## 2023-08-04 DIAGNOSIS — Z98.84 BARIATRIC SURGERY STATUS: ICD-10-CM

## 2023-08-04 PROCEDURE — 99214 OFFICE O/P EST MOD 30 MIN: CPT | Mod: 95

## 2023-08-04 RX ORDER — TOPIRAMATE 25 MG/1
25 TABLET, FILM COATED ORAL
Qty: 30 | Refills: 0 | Status: ACTIVE | COMMUNITY
Start: 2023-05-18 | End: 1900-01-01

## 2023-08-04 NOTE — REVIEW OF SYSTEMS
[Patient Intake Form Reviewed] : Patient intake form was reviewed [Negative] : Allergic/Immunologic [Constipation] : constipation

## 2023-08-04 NOTE — REASON FOR VISIT
[Home] : at home, [unfilled] , at the time of the visit. [Medical Office: ([facility])___] : at the medical office located in  [Follow-Up Visit] : a follow-up visit for [S/P Bariatric Surgery] : s/p bariatric surgery

## 2023-08-08 NOTE — HISTORY OF PRESENT ILLNESS
[Procedure: ___] : Procedure performed: [unfilled]  [Date of Surgery: ___] : Date of Surgery:   [unfilled] [Surgeon Name:   ___] : Surgeon Name: Dr. SOLIZ [Pre-Op Weight ___] : Pre-op weight was [unfilled] lbs [de-identified] : 28-year-old woman s/p Laparoscopic Sleeve Gastrectomy. C/ O constipation, Patient doing well. Reports no abdominal pain, nausea/vomiting, reflux or diarrhea Patient eating 3 protein-rich meals/day, trying to drink adequate zero-calorie fluids/day, taking bariatric vitamins, and walking for exercise. Pt taking phentermine and topiramate for weight loss ~2 month ago; lost 8 lbs. since starting this regimen and is tolerating medication well.   History of bariatric surgery: Laparoscopic sleeve gastrectomy Obesity comorbidities: None Comorbidities improved/resolved: None Anti-obesity medication: Phentermine and topiramate Obesity medication side effects: Constipation    Personal or family history of: Pancreatitis: No Gallstones: No Kidney stones: No MEN syndrome: No Medullary thyroid cancer: No   Current dietary lifestyle: Breakfast: Eggs Lunch: salads, lean protein Dinner: lean protein Snacks: Cucumber, fruit Drinks: water, coffee   Activity Lifestyle: Sleep: 6-8 hrs. Physical activity/exercise: walking Work: Sedentary job Smoking/ETOH: No

## 2023-08-08 NOTE — ASSESSMENT
[FreeTextEntry1] :         28-year-old woman s/p Laparoscopic Sleeve Gastrectomy. C/ O constipation, Patient doing well. Reports no abdominal pain, nausea/vomiting, reflux or diarrhea.     Renew Phentermine (37.5 mg daily with breakfast) and topiramate (25 mg at bedtime) Avoid pregnancy Continue 3 protein-focused meals/day (aim for 60 g - 70 g protein/day) Encourage zero calorie fluid intake (64 oz/day) Continue bariatric vitamins.  Exercise with cardio (aim for 8 k- 10 k steps/day), and strength training 2 x/week Probiotics for gut health Follow-up in 1 month All questions answered.  Call with any questions or concerns  Time before and after visit spent reviewing chart.

## 2024-01-29 ENCOUNTER — APPOINTMENT (OUTPATIENT)
Dept: BARIATRICS/WEIGHT MGMT | Facility: CLINIC | Age: 29
End: 2024-01-29
Payer: COMMERCIAL

## 2024-01-29 ENCOUNTER — OUTPATIENT (OUTPATIENT)
Dept: OUTPATIENT SERVICES | Facility: HOSPITAL | Age: 29
LOS: 1 days | End: 2024-01-29
Payer: COMMERCIAL

## 2024-01-29 VITALS — BODY MASS INDEX: 33.31 KG/M2 | HEIGHT: 63 IN | WEIGHT: 188 LBS

## 2024-01-29 DIAGNOSIS — Z87.798 PERSONAL HISTORY OF OTHER (CORRECTED) CONGENITAL MALFORMATIONS: Chronic | ICD-10-CM

## 2024-01-29 DIAGNOSIS — Z90.89 ACQUIRED ABSENCE OF OTHER ORGANS: Chronic | ICD-10-CM

## 2024-01-29 DIAGNOSIS — I10 ESSENTIAL (PRIMARY) HYPERTENSION: ICD-10-CM

## 2024-01-29 PROCEDURE — G0463: CPT

## 2024-01-29 PROCEDURE — 99205 OFFICE O/P NEW HI 60 MIN: CPT

## 2024-01-29 RX ORDER — PHENTERMINE HYDROCHLORIDE 15 MG/1
15 CAPSULE ORAL
Qty: 30 | Refills: 0 | Status: ACTIVE | COMMUNITY
Start: 2024-01-29 | End: 1900-01-01

## 2024-01-29 RX ORDER — SEMAGLUTIDE 0.25 MG/.5ML
0.25 INJECTION, SOLUTION SUBCUTANEOUS
Qty: 1 | Refills: 2 | Status: ACTIVE | COMMUNITY
Start: 2024-01-29 | End: 1900-01-01

## 2024-01-29 NOTE — ASSESSMENT
[FreeTextEntry1] : Bariatric surgery history: VSG Obesity co-morbidities: none Anti-obesity medications: phentermine, topiramate, Obesity medication side effects: no  Recs: -discussed calorie reduction options: plant-based whole food diet -three meal, no snacking,  -discussed the avoidance of meat products, processed meat, sugars/ AS/oils/fats/dairy, refined carbohydrates,  - focus more on WF -counseled about meal timing and portion: large breakfast- eat to fullness, increased starch, increase veg/fruit for L/D -goal water intake - 64 oz per day -goal physical activity- c/w going to gym, increased cardio/mod activity  - adequate sleep (7+ hours, uninterrupted) was emphasized - worked w/ RD in past, not interested to do at this time. - wants to start Wegovy- understands this may not be available- will send script for 0.25mg, if not available, she should fill the script for phentermine 15mg.  - labs reviewed, check on next visit

## 2024-01-29 NOTE — HISTORY OF PRESENT ILLNESS
[FreeTextEntry1] :   PATIENT WAS NOTIFIED THAT ANYTHING WE DISCUSSED IN OUR MEETING TODAY MAY BE REFLECTED IN WRITING IN THE VISIT NOTE WHICH WILL BE AVAILABLE TO OTHER MEDICAL PROVIDERS TO REVIEW AS PART OF ROUTINE PATIENT CARE.  PATIENT VERBALLY AGREED.   29 year old female who presents for evaluation and treatment of obesity.    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  Past weight loss attempts include:  short time for ozempic. has tried WW, MANISHA, b12 shots, best that worked was gym and meds     SLEEP:  sleeps at 10pm, wakes at 0700 Uninterrupted? no, tosses a lot, can go back snoring: Yes, no NAVI neg sleep study   FOOD: B: 0700, scrambled eggs, o/n oats, english muffin, coffee- oatmilk creamer L:12pm, left overs,- turkey meatloaf, carrots, chick frances wrap D: 6pm, turkey, chicken, shrimp, sausage- crock pot, cooks w/ oil, w/ veggie, salad, rice bowl, pasta   snacks: feels hungry, looks for things, baked chips, chocolate, salty snacks, muffins eating after dinner: yes, ice cream   Sodas/fast food/processed foods: no soda, rarely FF- black, burger/fries, eats out 1x/week- diner, greek, , sushi   Water intake per day: 40oz +   PHYSICAL ACTIVITY: Patient enjoys: F45 doing w/ fiance 3-4x/week x 45mins, circuit

## 2024-02-06 DIAGNOSIS — R79.89 OTHER SPECIFIED ABNORMAL FINDINGS OF BLOOD CHEMISTRY: ICD-10-CM

## 2024-02-06 DIAGNOSIS — E28.2 POLYCYSTIC OVARIAN SYNDROME: ICD-10-CM

## 2024-02-06 DIAGNOSIS — E78.1 PURE HYPERGLYCERIDEMIA: ICD-10-CM

## 2024-02-06 DIAGNOSIS — E66.9 OBESITY, UNSPECIFIED: ICD-10-CM

## 2024-03-12 ENCOUNTER — OUTPATIENT (OUTPATIENT)
Dept: OUTPATIENT SERVICES | Facility: HOSPITAL | Age: 29
LOS: 1 days | End: 2024-03-12
Payer: COMMERCIAL

## 2024-03-12 ENCOUNTER — APPOINTMENT (OUTPATIENT)
Dept: BARIATRICS/WEIGHT MGMT | Facility: CLINIC | Age: 29
End: 2024-03-12
Payer: COMMERCIAL

## 2024-03-12 VITALS — WEIGHT: 193 LBS | BODY MASS INDEX: 34.19 KG/M2

## 2024-03-12 DIAGNOSIS — K21.9 GASTRO-ESOPHAGEAL REFLUX DISEASE W/OUT ESOPHAGITIS: ICD-10-CM

## 2024-03-12 DIAGNOSIS — E78.1 PURE HYPERGLYCERIDEMIA: ICD-10-CM

## 2024-03-12 DIAGNOSIS — I10 ESSENTIAL (PRIMARY) HYPERTENSION: ICD-10-CM

## 2024-03-12 DIAGNOSIS — Z90.89 ACQUIRED ABSENCE OF OTHER ORGANS: Chronic | ICD-10-CM

## 2024-03-12 PROCEDURE — G0463: CPT

## 2024-03-12 PROCEDURE — 99214 OFFICE O/P EST MOD 30 MIN: CPT | Mod: 95

## 2024-03-12 RX ORDER — TIRZEPATIDE 2.5 MG/.5ML
2.5 INJECTION, SOLUTION SUBCUTANEOUS
Qty: 4 | Refills: 1 | Status: ACTIVE | COMMUNITY
Start: 2024-03-12 | End: 1900-01-01

## 2024-03-12 RX ORDER — PANTOPRAZOLE 40 MG/1
40 TABLET, DELAYED RELEASE ORAL
Qty: 90 | Refills: 3 | Status: ACTIVE | COMMUNITY
Start: 2023-01-25 | End: 1900-01-01

## 2024-03-12 NOTE — REASON FOR VISIT
[Home] : at home, [unfilled] , at the time of the visit. [Medical Office: ([facility])___] : at the medical office located in  [Patient] : the patient [Follow-Up] : a follow-up visit [FreeTextEntry1] : obesity

## 2024-03-12 NOTE — HISTORY OF PRESENT ILLNESS
[FreeTextEntry1] :   PATIENT WAS NOTIFIED THAT ANYTHING WE DISCUSSED IN OUR MEETING TODAY MAY BE REFLECTED IN WRITING IN THE VISIT NOTE WHICH WILL BE AVAILABLE TO OTHER MEDICAL PROVIDERS TO REVIEW AS PART OF ROUTINE PATIENT CARE.  PATIENT VERBALLY AGREED.   29 year old female who presents for evaluation and treatment of obesity.  Today,  pt was unable to obtain wegovy, "out of stock" in several pharmacies has been on phentermine 15mg, no s/e wants to try zepbound cannot be on topiramate as she recently came off Birth control weight about the same as prior, 193 has been trying to stick to 3 meals and no snacking. in the past month she's had traveled for 2 weddings has been trying to focus more on WF, increased fruit intake. BF- having o/n oats w/ oat/almond milk, sometimes puts on whey protein powder.  STill feels that she "needs" turkey/chicken as protein. Gym 3-4x/week    WEIGHT HISTORY: Lowest adult weight: 160 ( on phentermine, gym, before 2020) Highest adult weight: 225 ( prior to surgeyr), dropped 50 lbs post op over 1 year Current: 188 lbs Had VSG in june 2022 had been on phentermine/topiramate, stopped 7 mos ago  snoring: Yes, no NAVI neg sleep study

## 2024-03-19 DIAGNOSIS — E66.9 OBESITY, UNSPECIFIED: ICD-10-CM

## 2024-03-19 DIAGNOSIS — E88.819 INSULIN RESISTANCE, UNSPECIFIED: ICD-10-CM

## 2024-03-19 DIAGNOSIS — K21.9 GASTRO-ESOPHAGEAL REFLUX DISEASE WITHOUT ESOPHAGITIS: ICD-10-CM

## 2024-03-19 DIAGNOSIS — E78.1 PURE HYPERGLYCERIDEMIA: ICD-10-CM

## 2024-04-16 LAB
25(OH)D3 SERPL-MCNC: 22.4 NG/ML
ALBUMIN SERPL ELPH-MCNC: 4.4 G/DL
ALP BLD-CCNC: 79 U/L
ALT SERPL-CCNC: 28 U/L
ANION GAP SERPL CALC-SCNC: 10 MMOL/L
AST SERPL-CCNC: 20 U/L
BASOPHILS # BLD AUTO: 0.03 K/UL
BASOPHILS NFR BLD AUTO: 0.5 %
BILIRUB SERPL-MCNC: 0.3 MG/DL
BUN SERPL-MCNC: 7 MG/DL
CALCIUM SERPL-MCNC: 9.7 MG/DL
CHLORIDE SERPL-SCNC: 103 MMOL/L
CHOLEST SERPL-MCNC: 164 MG/DL
CO2 SERPL-SCNC: 23 MMOL/L
CREAT SERPL-MCNC: 0.76 MG/DL
CRP SERPL-MCNC: <3 MG/L
EGFR: 109 ML/MIN/1.73M2
EOSINOPHIL # BLD AUTO: 0.13 K/UL
EOSINOPHIL NFR BLD AUTO: 2.2 %
ESTIMATED AVERAGE GLUCOSE: 111 MG/DL
GLUCOSE SERPL-MCNC: 100 MG/DL
HBA1C MFR BLD HPLC: 5.5 %
HCT VFR BLD CALC: 39 %
HDLC SERPL-MCNC: 44 MG/DL
HGB BLD-MCNC: 13.2 G/DL
IMM GRANULOCYTES NFR BLD AUTO: 0.2 %
INSULIN SERPL-MCNC: 25.8 UU/ML
LDLC SERPL CALC-MCNC: 100 MG/DL
LYMPHOCYTES # BLD AUTO: 2.49 K/UL
LYMPHOCYTES NFR BLD AUTO: 42.9 %
MAN DIFF?: NORMAL
MCHC RBC-ENTMCNC: 29.6 PG
MCHC RBC-ENTMCNC: 33.8 GM/DL
MCV RBC AUTO: 87.4 FL
MONOCYTES # BLD AUTO: 0.39 K/UL
MONOCYTES NFR BLD AUTO: 6.7 %
NEUTROPHILS # BLD AUTO: 2.76 K/UL
NEUTROPHILS NFR BLD AUTO: 47.5 %
NONHDLC SERPL-MCNC: 120 MG/DL
PLATELET # BLD AUTO: 248 K/UL
POTASSIUM SERPL-SCNC: 4.5 MMOL/L
PROT SERPL-MCNC: 7.1 G/DL
RBC # BLD: 4.46 M/UL
RBC # FLD: 12 %
SODIUM SERPL-SCNC: 136 MMOL/L
TRIGL SERPL-MCNC: 112 MG/DL
TSH SERPL-ACNC: 0.88 UIU/ML
WBC # FLD AUTO: 5.81 K/UL

## 2024-04-22 ENCOUNTER — APPOINTMENT (OUTPATIENT)
Dept: BARIATRICS/WEIGHT MGMT | Facility: CLINIC | Age: 29
End: 2024-04-22
Payer: COMMERCIAL

## 2024-04-22 ENCOUNTER — OUTPATIENT (OUTPATIENT)
Dept: OUTPATIENT SERVICES | Facility: HOSPITAL | Age: 29
LOS: 1 days | End: 2024-04-22
Payer: COMMERCIAL

## 2024-04-22 VITALS — BODY MASS INDEX: 34.37 KG/M2 | WEIGHT: 194 LBS

## 2024-04-22 DIAGNOSIS — I10 ESSENTIAL (PRIMARY) HYPERTENSION: ICD-10-CM

## 2024-04-22 DIAGNOSIS — E66.9 OBESITY, UNSPECIFIED: ICD-10-CM

## 2024-04-22 DIAGNOSIS — E88.819 INSULIN RESISTANCE, UNSPECIFIED: ICD-10-CM

## 2024-04-22 DIAGNOSIS — Z90.89 ACQUIRED ABSENCE OF OTHER ORGANS: Chronic | ICD-10-CM

## 2024-04-22 DIAGNOSIS — E28.2 POLYCYSTIC OVARIAN SYNDROME: ICD-10-CM

## 2024-04-22 DIAGNOSIS — R79.89 OTHER SPECIFIED ABNORMAL FINDINGS OF BLOOD CHEMISTRY: ICD-10-CM

## 2024-04-22 DIAGNOSIS — K21.9 GASTRO-ESOPHAGEAL REFLUX DISEASE W/OUT ESOPHAGITIS: ICD-10-CM

## 2024-04-22 DIAGNOSIS — Z87.798 PERSONAL HISTORY OF OTHER (CORRECTED) CONGENITAL MALFORMATIONS: Chronic | ICD-10-CM

## 2024-04-22 PROCEDURE — G0463: CPT

## 2024-04-22 PROCEDURE — 99214 OFFICE O/P EST MOD 30 MIN: CPT | Mod: 95

## 2024-04-22 RX ORDER — METFORMIN HYDROCHLORIDE 500 MG/1
500 TABLET, COATED ORAL
Qty: 120 | Refills: 1 | Status: ACTIVE | COMMUNITY
Start: 2024-04-22 | End: 1900-01-01

## 2024-04-22 RX ORDER — PHENTERMINE HYDROCHLORIDE 37.5 MG/1
37.5 TABLET ORAL
Qty: 30 | Refills: 0 | Status: ACTIVE | COMMUNITY
Start: 2023-05-18 | End: 1900-01-01

## 2024-04-22 NOTE — REASON FOR VISIT
[Follow-Up] : a follow-up visit [Home] : at home, [unfilled] , at the time of the visit. [Other Location: e.g. Home (Enter Location, City,State)___] : at [unfilled] [Patient] : the patient [FreeTextEntry1] : obesity

## 2024-04-22 NOTE — HISTORY OF PRESENT ILLNESS
[FreeTextEntry1] :   PATIENT WAS NOTIFIED THAT ANYTHING WE DISCUSSED IN OUR MEETING TODAY MAY BE REFLECTED IN WRITING IN THE VISIT NOTE WHICH WILL BE AVAILABLE TO OTHER MEDICAL PROVIDERS TO REVIEW AS PART OF ROUTINE PATIENT CARE.  PATIENT VERBALLY AGREED.   29 year old female who presents for evaluation and treatment of obesity.  Today,  - phentermine full tab- no issues, no s/e - Last week 188, today 194 (fluctuates) - Food: BF- o/n oats- fruit, yogurt, jose, granola, L- lentils, meat, bean salad w/ enedelia/lettuce/tomato- dressing is [place], D- stuffed peppers w/ ground turkey, frances salad w/ chick, pork, rice - was unable to get wegovy/zepbound- not available, not covered - PA- 4-5x/ gym- F45 cardio stations w/ Wt lifiting - got labs few weeks ago has been trying to stick to 3 meals and no snacking. has been trying to focus more on WF, increased fruit intake.    WEIGHT HISTORY: Lowest adult weight: 160 ( on phentermine, gym, before 2020) Highest adult weight: 225 ( prior to surgeyr), dropped 50 lbs post op over 1 year Current: 188 lbs Had VSG in june 2022 had been on phentermine/topiramate, stopped 7 mos ago  snoring: Yes, no NAVI neg sleep study

## 2024-04-22 NOTE — ASSESSMENT
[FreeTextEntry1] : Bariatric surgery history: VSG Obesity co-morbidities: pcos, vit d deficiency, insulin resistance, Anti-obesity medications: phentermine, topiramate, Obesity medication side effects: no  Recs: - renew phentermine  full tab,  - start metformin 1000mg daily, increase to 1500mg in 1 month if tolerating - d/w her to c/w increasing PB/WF, avoid high fat meat products - majority of calories before 4pm - c/w 3 distinct meals, no snacking - c/w gym  - encouraged water goal 64oz - labs reviewed- low vit D- start supplement of 5000u/day, +elevated insulin level- d/w her insulin resistance in setting of pcos  f/u in 4-6 weeks

## 2024-04-29 DIAGNOSIS — E28.2 POLYCYSTIC OVARIAN SYNDROME: ICD-10-CM

## 2024-04-29 DIAGNOSIS — R79.89 OTHER SPECIFIED ABNORMAL FINDINGS OF BLOOD CHEMISTRY: ICD-10-CM

## 2024-04-29 DIAGNOSIS — K21.9 GASTRO-ESOPHAGEAL REFLUX DISEASE WITHOUT ESOPHAGITIS: ICD-10-CM

## 2024-04-29 DIAGNOSIS — E66.9 OBESITY, UNSPECIFIED: ICD-10-CM

## 2024-04-29 DIAGNOSIS — E88.819 INSULIN RESISTANCE, UNSPECIFIED: ICD-10-CM

## 2024-06-12 ENCOUNTER — APPOINTMENT (OUTPATIENT)
Dept: BARIATRICS/WEIGHT MGMT | Facility: CLINIC | Age: 29
End: 2024-06-12

## 2024-07-31 ENCOUNTER — NON-APPOINTMENT (OUTPATIENT)
Age: 29
End: 2024-07-31

## 2025-02-03 ENCOUNTER — NON-APPOINTMENT (OUTPATIENT)
Age: 30
End: 2025-02-03

## 2025-02-08 ENCOUNTER — NON-APPOINTMENT (OUTPATIENT)
Age: 30
End: 2025-02-08

## 2025-03-17 ENCOUNTER — NON-APPOINTMENT (OUTPATIENT)
Age: 30
End: 2025-03-17

## 2025-03-18 ENCOUNTER — APPOINTMENT (OUTPATIENT)
Dept: FAMILY MEDICINE | Facility: CLINIC | Age: 30
End: 2025-03-18
Payer: COMMERCIAL

## 2025-03-18 VITALS
RESPIRATION RATE: 16 BRPM | SYSTOLIC BLOOD PRESSURE: 122 MMHG | HEIGHT: 63 IN | HEART RATE: 93 BPM | BODY MASS INDEX: 34.73 KG/M2 | WEIGHT: 196 LBS | DIASTOLIC BLOOD PRESSURE: 82 MMHG | OXYGEN SATURATION: 99 %

## 2025-03-18 DIAGNOSIS — E88.819 INSULIN RESISTANCE, UNSPECIFIED: ICD-10-CM

## 2025-03-18 DIAGNOSIS — D73.4 CYST OF SPLEEN: ICD-10-CM

## 2025-03-18 DIAGNOSIS — E28.2 POLYCYSTIC OVARIAN SYNDROME: ICD-10-CM

## 2025-03-18 DIAGNOSIS — E66.811 OBESITY, CLASS 1: ICD-10-CM

## 2025-03-18 DIAGNOSIS — Z00.00 ENCOUNTER FOR GENERAL ADULT MEDICAL EXAMINATION W/OUT ABNORMAL FINDINGS: ICD-10-CM

## 2025-03-18 DIAGNOSIS — R79.89 OTHER SPECIFIED ABNORMAL FINDINGS OF BLOOD CHEMISTRY: ICD-10-CM

## 2025-03-18 PROCEDURE — 36415 COLL VENOUS BLD VENIPUNCTURE: CPT

## 2025-03-18 PROCEDURE — 99213 OFFICE O/P EST LOW 20 MIN: CPT | Mod: 25

## 2025-03-18 PROCEDURE — 99395 PREV VISIT EST AGE 18-39: CPT

## 2025-03-18 RX ORDER — FLUOXETINE 10 MG/1
10 TABLET ORAL
Refills: 0 | Status: ACTIVE | COMMUNITY

## 2025-03-18 RX ORDER — BUSPIRONE HYDROCHLORIDE 5 MG/1
5 TABLET ORAL
Refills: 0 | Status: ACTIVE | COMMUNITY

## 2025-03-20 ENCOUNTER — TRANSCRIPTION ENCOUNTER (OUTPATIENT)
Age: 30
End: 2025-03-20

## 2025-03-20 LAB
25(OH)D3 SERPL-MCNC: 36.4 NG/ML
ALBUMIN SERPL ELPH-MCNC: 4.5 G/DL
ALP BLD-CCNC: 68 U/L
ALT SERPL-CCNC: 21 U/L
ANION GAP SERPL CALC-SCNC: 14 MMOL/L
AST SERPL-CCNC: 24 U/L
BASOPHILS # BLD AUTO: 0.04 K/UL
BASOPHILS NFR BLD AUTO: 0.5 %
BILIRUB SERPL-MCNC: 0.2 MG/DL
BUN SERPL-MCNC: 12 MG/DL
C TRACH RRNA SPEC QL NAA+PROBE: NOT DETECTED
CALCIUM SERPL-MCNC: 9.9 MG/DL
CHLORIDE SERPL-SCNC: 103 MMOL/L
CHOLEST SERPL-MCNC: 190 MG/DL
CO2 SERPL-SCNC: 20 MMOL/L
CREAT SERPL-MCNC: 0.64 MG/DL
EGFRCR SERPLBLD CKD-EPI 2021: 123 ML/MIN/1.73M2
EOSINOPHIL # BLD AUTO: 0.14 K/UL
EOSINOPHIL NFR BLD AUTO: 1.6 %
ESTIMATED AVERAGE GLUCOSE: 120 MG/DL
FOLATE SERPL-MCNC: 9.4 NG/ML
GLUCOSE SERPL-MCNC: 115 MG/DL
HBA1C MFR BLD HPLC: 5.8 %
HCT VFR BLD CALC: 45.1 %
HCV AB SER QL: NONREACTIVE
HCV S/CO RATIO: 0.21 S/CO
HDLC SERPL-MCNC: 36 MG/DL
HGB BLD-MCNC: 14.7 G/DL
HIV1+2 AB SPEC QL IA.RAPID: NONREACTIVE
IMM GRANULOCYTES NFR BLD AUTO: 0.2 %
LDLC SERPL-MCNC: 84 MG/DL
LYMPHOCYTES # BLD AUTO: 3.09 K/UL
LYMPHOCYTES NFR BLD AUTO: 35.2 %
MAN DIFF?: NORMAL
MCHC RBC-ENTMCNC: 29.6 PG
MCHC RBC-ENTMCNC: 32.6 G/DL
MCV RBC AUTO: 90.7 FL
MONOCYTES # BLD AUTO: 0.57 K/UL
MONOCYTES NFR BLD AUTO: 6.5 %
N GONORRHOEA RRNA SPEC QL NAA+PROBE: NOT DETECTED
NEUTROPHILS # BLD AUTO: 4.92 K/UL
NEUTROPHILS NFR BLD AUTO: 56 %
NONHDLC SERPL-MCNC: 154 MG/DL
PLATELET # BLD AUTO: 312 K/UL
POTASSIUM SERPL-SCNC: 4.9 MMOL/L
PROT SERPL-MCNC: 7.4 G/DL
RBC # BLD: 4.97 M/UL
RBC # FLD: 12.7 %
SODIUM SERPL-SCNC: 137 MMOL/L
SOURCE AMPLIFICATION: NORMAL
T PALLIDUM AB SER QL IA: NEGATIVE
TRIGL SERPL-MCNC: 434 MG/DL
TSH SERPL-ACNC: 1.61 UIU/ML
VIT B12 SERPL-MCNC: 672 PG/ML
WBC # FLD AUTO: 8.78 K/UL

## 2025-03-24 LAB — METHYLMALONATE SERPL-SCNC: 111 NMOL/L

## 2025-04-14 ENCOUNTER — TRANSCRIPTION ENCOUNTER (OUTPATIENT)
Age: 30
End: 2025-04-14

## 2025-04-14 DIAGNOSIS — T75.3XXA MOTION SICKNESS, INITIAL ENCOUNTER: ICD-10-CM

## 2025-04-14 RX ORDER — ONDANSETRON 4 MG/1
4 TABLET, ORALLY DISINTEGRATING ORAL
Qty: 15 | Refills: 0 | Status: ACTIVE | COMMUNITY
Start: 2025-04-14 | End: 1900-01-01

## 2025-04-14 RX ORDER — SCOPOLAMINE 1 MG/3D
1 PATCH, EXTENDED RELEASE TRANSDERMAL
Qty: 1 | Refills: 0 | Status: ACTIVE | COMMUNITY
Start: 2025-04-14 | End: 1900-01-01

## 2025-05-06 ENCOUNTER — TRANSCRIPTION ENCOUNTER (OUTPATIENT)
Age: 30
End: 2025-05-06

## 2025-05-06 DIAGNOSIS — B37.9 CANDIDIASIS, UNSPECIFIED: ICD-10-CM

## 2025-05-06 RX ORDER — FLUCONAZOLE 150 MG/1
150 TABLET ORAL
Qty: 2 | Refills: 0 | Status: ACTIVE | COMMUNITY
Start: 2025-05-06 | End: 1900-01-01

## 2025-07-15 ENCOUNTER — RESULT REVIEW (OUTPATIENT)
Age: 30
End: 2025-07-15

## 2025-07-21 ENCOUNTER — TRANSCRIPTION ENCOUNTER (OUTPATIENT)
Age: 30
End: 2025-07-21

## 2025-07-25 ENCOUNTER — APPOINTMENT (OUTPATIENT)
Dept: FAMILY MEDICINE | Facility: CLINIC | Age: 30
End: 2025-07-25
Payer: COMMERCIAL

## 2025-07-25 ENCOUNTER — LABORATORY RESULT (OUTPATIENT)
Age: 30
End: 2025-07-25

## 2025-07-25 VITALS
WEIGHT: 205 LBS | SYSTOLIC BLOOD PRESSURE: 107 MMHG | OXYGEN SATURATION: 98 % | TEMPERATURE: 99.4 F | DIASTOLIC BLOOD PRESSURE: 79 MMHG | RESPIRATION RATE: 16 BRPM | HEART RATE: 83 BPM | BODY MASS INDEX: 36.32 KG/M2 | HEIGHT: 63 IN

## 2025-07-25 DIAGNOSIS — R73.03 PREDIABETES.: ICD-10-CM

## 2025-07-25 DIAGNOSIS — R10.9 UNSPECIFIED ABDOMINAL PAIN: ICD-10-CM

## 2025-07-25 DIAGNOSIS — R53.83 OTHER FATIGUE: ICD-10-CM

## 2025-07-25 DIAGNOSIS — E66.811 OBESITY, CLASS 1: ICD-10-CM

## 2025-07-25 DIAGNOSIS — K21.9 GASTRO-ESOPHAGEAL REFLUX DISEASE W/OUT ESOPHAGITIS: ICD-10-CM

## 2025-07-25 PROCEDURE — 36415 COLL VENOUS BLD VENIPUNCTURE: CPT

## 2025-07-25 PROCEDURE — 99215 OFFICE O/P EST HI 40 MIN: CPT

## 2025-07-25 PROCEDURE — G2211 COMPLEX E/M VISIT ADD ON: CPT | Mod: NC

## 2025-07-25 RX ORDER — PHENTERMINE AND TOPIRAMATE 3.75; 23 MG/1; MG/1
3.75-23 CAPSULE, EXTENDED RELEASE ORAL DAILY
Qty: 30 | Refills: 0 | Status: ACTIVE | COMMUNITY
Start: 2025-07-25 | End: 1900-01-01

## 2025-07-28 ENCOUNTER — TRANSCRIPTION ENCOUNTER (OUTPATIENT)
Age: 30
End: 2025-07-28

## 2025-07-28 LAB
ANA SCREEN BY IMMUNOASSAY: NEGATIVE
CENTROMERE B AB SER-ACNC: <0.2 AL
CHOLEST SERPL-MCNC: 231 MG/DL
CHROMATIN AB SERPL-ACNC: <0.2 AL
DSDNA AB SER-ACNC: 1 IU/ML
ENA JO1 AB SER-ACNC: <0.2 AL
ENA RNP AB SER-ACNC: <0.2 AL
ENA SCL70 AB SER-ACNC: <0.2 AL
ENA SM AB SER-ACNC: <0.2 AL
ENA SS-A AB SER-ACNC: <0.2 AL
ENA SS-B AB SER-ACNC: <0.2 AL
ESTIMATED AVERAGE GLUCOSE: 111 MG/DL
GLIADIN IGA SER QL: <0.2 U/ML
GLIADIN IGG SER QL: 1.2 U/ML
GLIADIN PEPTIDE IGA SER-ACNC: NEGATIVE
GLIADIN PEPTIDE IGG SER-ACNC: NEGATIVE
HBA1C MFR BLD HPLC: 5.5 %
HDLC SERPL-MCNC: 48 MG/DL
LDLC SERPL-MCNC: 150 MG/DL
NONHDLC SERPL-MCNC: 184 MG/DL
RIBOSOMAL P AB SER-ACNC: <0.2 AL
THYROPEROXIDASE AB SERPL IA-ACNC: 15.3 IU/ML
TRIGL SERPL-MCNC: 187 MG/DL
TTG IGA SER IA-ACNC: <0.5 U/ML
TTG IGA SER-ACNC: NEGATIVE
TTG IGG SER IA-ACNC: <0.8 U/ML
TTG IGG SER IA-ACNC: NEGATIVE

## 2025-08-04 ENCOUNTER — TRANSCRIPTION ENCOUNTER (OUTPATIENT)
Age: 30
End: 2025-08-04

## 2025-08-21 ENCOUNTER — APPOINTMENT (OUTPATIENT)
Dept: FAMILY MEDICINE | Facility: CLINIC | Age: 30
End: 2025-08-21

## (undated) DEVICE — SUT SOFSILK 0 30" V-20

## (undated) DEVICE — TROCAR COVIDIEN VISIPORT PLUS 5-12MM

## (undated) DEVICE — ENDO SHEARS COVIDIEN 5MM LONG W MONOPOLAR CAUTERY

## (undated) DEVICE — TUBING STRYKEFLOW II SUCTION / IRRIGATOR

## (undated) DEVICE — TROCAR ETHICON ENDOPATH XCEL BLADELESS 5MM-100MM

## (undated) DEVICE — CATH ELECHMSTAT  INJ 7FR 210CM

## (undated) DEVICE — DRAPE 3/4 SHEET 52X76"

## (undated) DEVICE — LIGASURE MARYLAND JAW LAPAROSCOPIC SEALER 37CM

## (undated) DEVICE — GLV 6.5 PROTEXIS

## (undated) DEVICE — ENDOCATCH II 15MM

## (undated) DEVICE — SYR LUER LOK 10CC

## (undated) DEVICE — PROBE FIAPC DIA 2.3MM/7FR LNTH 220CM/7.2FT

## (undated) DEVICE — SHEARS HARMONIC ACE 5MM X 36CM CURVED TIP

## (undated) DEVICE — NDL HYPO REGULAR BEVEL 22G X 1.5"

## (undated) DEVICE — SYR LUER LOK 20CC

## (undated) DEVICE — TROCAR ETHICON XCEL UNIVERSAL SLEEVE W OPTIVIEW 5MM-100MM

## (undated) DEVICE — TUBING PLUME AWAY 4.0

## (undated) DEVICE — WRAP COMPRESSION CALF MED

## (undated) DEVICE — SUT POLYSORB 3-0 30" V-20

## (undated) DEVICE — SUT POLYSORB 0 30" GU-46

## (undated) DEVICE — D HELP - CLEARVIEW CLEARIFY SYSTEM

## (undated) DEVICE — DRAPE TOWEL BLUE 17" X 24"

## (undated) DEVICE — BLANKET WARMER UPPER ADULT

## (undated) DEVICE — TROCAR ETHICON 12MM XCEL BLADELESS

## (undated) DEVICE — SOL IRR BAG NS 0.9% 3000ML

## (undated) DEVICE — BLADE SAFETY LOCK #15

## (undated) DEVICE — ENDO SHEARS COVIDIEN 5MM W UNIPOLAR CAUTERY

## (undated) DEVICE — FOLEY TRAY 14FR 5CC LTX BAG CLOSED

## (undated) DEVICE — ELCTR EDGE BOVIE INSULATED BLADE TIP 2.75"

## (undated) DEVICE — TROCAR ETHICON 15MM XCEL BLADELESS

## (undated) DEVICE — STAPLER COVIDIEN ENDO GIA XL HANDLE

## (undated) DEVICE — TROCAR COVIDIEN ENDO CLOSE SUTURING DEVICE

## (undated) DEVICE — SUT MAXON 4-0 30" P-12

## (undated) DEVICE — TUBING STRYKER PNEUMOSURE HI FLOW RTP

## (undated) DEVICE — PACK GENERAL LAPAROSCOPY